# Patient Record
Sex: MALE | Race: WHITE | NOT HISPANIC OR LATINO | Employment: FULL TIME | URBAN - METROPOLITAN AREA
[De-identification: names, ages, dates, MRNs, and addresses within clinical notes are randomized per-mention and may not be internally consistent; named-entity substitution may affect disease eponyms.]

---

## 2024-09-27 ENCOUNTER — HOSPITAL ENCOUNTER (INPATIENT)
Facility: HOSPITAL | Age: 49
LOS: 5 days | Discharge: HOME | DRG: 871 | End: 2024-10-02
Attending: EMERGENCY MEDICINE | Admitting: INTERNAL MEDICINE
Payer: COMMERCIAL

## 2024-09-27 ENCOUNTER — APPOINTMENT (OUTPATIENT)
Dept: CT IMAGING | Facility: HOSPITAL | Age: 49
DRG: 871 | End: 2024-09-27
Payer: COMMERCIAL

## 2024-09-27 ENCOUNTER — APPOINTMENT (OUTPATIENT)
Dept: GENERAL RADIOLOGY | Facility: HOSPITAL | Age: 49
DRG: 871 | End: 2024-09-27
Payer: COMMERCIAL

## 2024-09-27 DIAGNOSIS — R13.12 DYSPHAGIA, OROPHARYNGEAL: ICD-10-CM

## 2024-09-27 DIAGNOSIS — T68.XXXA HYPOTHERMIA, INITIAL ENCOUNTER: Primary | ICD-10-CM

## 2024-09-27 DIAGNOSIS — E13.11 DIABETIC KETOACIDOSIS WITH COMA ASSOCIATED WITH OTHER SPECIFIED DIABETES MELLITUS: ICD-10-CM

## 2024-09-27 PROBLEM — N17.9 AKI (ACUTE KIDNEY INJURY): Status: ACTIVE | Noted: 2024-09-27

## 2024-09-27 PROBLEM — E11.10 DKA (DIABETIC KETOACIDOSIS): Status: ACTIVE | Noted: 2024-09-27

## 2024-09-27 PROBLEM — D72.829 LEUKOCYTOSIS: Status: ACTIVE | Noted: 2024-09-27

## 2024-09-27 PROBLEM — E87.20 METABOLIC ACIDOSIS: Status: ACTIVE | Noted: 2024-09-27

## 2024-09-27 PROBLEM — E83.39 HYPERPHOSPHATEMIA: Status: ACTIVE | Noted: 2024-09-27

## 2024-09-27 PROBLEM — G93.41 METABOLIC ENCEPHALOPATHY: Status: ACTIVE | Noted: 2024-09-27

## 2024-09-27 PROBLEM — J96.91 HYPOXIC RESPIRATORY FAILURE: Status: ACTIVE | Noted: 2024-09-27

## 2024-09-27 LAB
ACETONE BLD QL: ABNORMAL
ALBUMIN SERPL-MCNC: 3.5 G/DL (ref 3.5–5.2)
ALBUMIN/GLOB SERPL: 1.2 G/DL
ALP SERPL-CCNC: 199 U/L (ref 39–117)
ALT SERPL W P-5'-P-CCNC: 20 U/L (ref 1–41)
ANION GAP SERPL CALCULATED.3IONS-SCNC: 20.6 MMOL/L (ref 5–15)
ANION GAP SERPL CALCULATED.3IONS-SCNC: 27 MMOL/L (ref 5–15)
ANION GAP SERPL CALCULATED.3IONS-SCNC: 36.3 MMOL/L (ref 5–15)
ANISOCYTOSIS BLD QL: ABNORMAL
ARTERIAL PATENCY WRIST A: POSITIVE
ARTERIAL PATENCY WRIST A: POSITIVE
AST SERPL-CCNC: 17 U/L (ref 1–40)
ATMOSPHERIC PRESS: 723.3 MMHG
ATMOSPHERIC PRESS: 725.2 MMHG
B PARAPERT DNA SPEC QL NAA+PROBE: NOT DETECTED
B PERT DNA SPEC QL NAA+PROBE: NOT DETECTED
BACTERIA UR QL AUTO: ABNORMAL /HPF
BASE EXCESS BLDA CALC-SCNC: -25.2 MMOL/L (ref -2–2)
BASE EXCESS BLDA CALC-SCNC: -29.8 MMOL/L (ref -2–2)
BASOPHILS # BLD AUTO: 0.1 10*3/MM3 (ref 0–0.2)
BASOPHILS NFR BLD AUTO: 0.3 % (ref 0–1.5)
BDY SITE: ABNORMAL
BDY SITE: ABNORMAL
BILIRUB SERPL-MCNC: 0.3 MG/DL (ref 0–1.2)
BILIRUB UR QL STRIP: NEGATIVE
BUN BLDA-MCNC: 49 MG/DL (ref 8–23)
BUN BLDA-MCNC: 54 MG/DL (ref 8–23)
BUN SERPL-MCNC: 37 MG/DL (ref 6–20)
BUN SERPL-MCNC: 55 MG/DL (ref 6–20)
BUN SERPL-MCNC: 55 MG/DL (ref 6–20)
BUN/CREAT SERPL: 22.5 (ref 7–25)
BUN/CREAT SERPL: 25.2 (ref 7–25)
BUN/CREAT SERPL: 29.1 (ref 7–25)
C PNEUM DNA NPH QL NAA+NON-PROBE: NOT DETECTED
CA-I BLDA-SCNC: 1.14 MMOL/L (ref 1.13–1.32)
CA-I BLDA-SCNC: 1.16 MMOL/L (ref 1.13–1.32)
CALCIUM SPEC-SCNC: 4.4 MG/DL (ref 8.6–10.5)
CALCIUM SPEC-SCNC: 7.8 MG/DL (ref 8.6–10.5)
CALCIUM SPEC-SCNC: 8.8 MG/DL (ref 8.6–10.5)
CHLORIDE BLDA-SCNC: 112 MMOL/L (ref 98–107)
CHLORIDE BLDA-SCNC: 117 MMOL/L (ref 98–107)
CHLORIDE SERPL-SCNC: 104 MMOL/L (ref 98–107)
CHLORIDE SERPL-SCNC: 117 MMOL/L (ref 98–107)
CHLORIDE SERPL-SCNC: 94 MMOL/L (ref 98–107)
CLARITY UR: ABNORMAL
CO2 BLDA-SCNC: 8.4 MMOL/L (ref 23–27)
CO2 BLDA-SCNC: <5 MMOL/L (ref 23–27)
CO2 SERPL-SCNC: 4.7 MMOL/L (ref 22–29)
CO2 SERPL-SCNC: 5.4 MMOL/L (ref 22–29)
CO2 SERPL-SCNC: 7 MMOL/L (ref 22–29)
COLOR UR: YELLOW
CREAT BLDA-MCNC: 1.65 MG/DL (ref 0.6–1.3)
CREAT BLDA-MCNC: 1.79 MG/DL (ref 0.6–1.3)
CREAT SERPL-MCNC: 1.27 MG/DL (ref 0.76–1.27)
CREAT SERPL-MCNC: 2.18 MG/DL (ref 0.76–1.27)
CREAT SERPL-MCNC: 2.44 MG/DL (ref 0.76–1.27)
D-LACTATE SERPL-SCNC: 1.1 MMOL/L
D-LACTATE SERPL-SCNC: 1.8 MMOL/L (ref 0.5–2)
D-LACTATE SERPL-SCNC: 2 MMOL/L
DEPRECATED RDW RBC AUTO: 45.1 FL (ref 37–54)
DEPRECATED RDW RBC AUTO: 45.9 FL (ref 37–54)
EGFRCR SERPLBLD CKD-EPI 2021: 31.2 ML/MIN/1.73
EGFRCR SERPLBLD CKD-EPI 2021: 35.8 ML/MIN/1.73
EGFRCR SERPLBLD CKD-EPI 2021: 68.4 ML/MIN/1.73
EOSINOPHIL # BLD AUTO: 0.01 10*3/MM3 (ref 0–0.4)
EOSINOPHIL # BLD MANUAL: 0.34 10*3/MM3 (ref 0–0.4)
EOSINOPHIL NFR BLD AUTO: 0 % (ref 0.3–6.2)
EOSINOPHIL NFR BLD MANUAL: 1 % (ref 0.3–6.2)
ERYTHROCYTE [DISTWIDTH] IN BLOOD BY AUTOMATED COUNT: 12 % (ref 12.3–15.4)
ERYTHROCYTE [DISTWIDTH] IN BLOOD BY AUTOMATED COUNT: 12.2 % (ref 12.3–15.4)
FLUAV SUBTYP SPEC NAA+PROBE: NOT DETECTED
FLUBV RNA ISLT QL NAA+PROBE: NOT DETECTED
GLOBULIN UR ELPH-MCNC: 2.9 GM/DL
GLUCOSE BLDC GLUCOMTR-MCNC: 553 MG/DL (ref 70–99)
GLUCOSE BLDC GLUCOMTR-MCNC: >600 MG/DL (ref 70–99)
GLUCOSE BLDC GLUCOMTR-MCNC: >682 MG/DL (ref 70–99)
GLUCOSE BLDC GLUCOMTR-MCNC: >682 MG/DL (ref 70–99)
GLUCOSE SERPL-MCNC: 558 MG/DL (ref 65–99)
GLUCOSE SERPL-MCNC: 747 MG/DL (ref 65–99)
GLUCOSE SERPL-MCNC: 954 MG/DL (ref 65–99)
GLUCOSE UR STRIP-MCNC: ABNORMAL MG/DL
GRAN CASTS URNS QL MICRO: ABNORMAL /LPF
HADV DNA SPEC NAA+PROBE: NOT DETECTED
HBA1C MFR BLD: 13.9 % (ref 4.8–5.6)
HCO3 BLDA-SCNC: 4.7 MMOL/L (ref 22–26)
HCO3 BLDA-SCNC: 8.5 MMOL/L (ref 22–26)
HCOV 229E RNA SPEC QL NAA+PROBE: NOT DETECTED
HCOV HKU1 RNA SPEC QL NAA+PROBE: NOT DETECTED
HCOV NL63 RNA SPEC QL NAA+PROBE: NOT DETECTED
HCOV OC43 RNA SPEC QL NAA+PROBE: NOT DETECTED
HCT VFR BLD AUTO: 37.4 % (ref 37.5–51)
HCT VFR BLD AUTO: 47.8 % (ref 37.5–51)
HCT VFR BLD CALC: 47 % (ref 38–51)
HCT VFR BLD CALC: 48 % (ref 38–51)
HEMODILUTION: NO
HEMODILUTION: NO
HGB BLD-MCNC: 12.3 G/DL (ref 13–17.7)
HGB BLD-MCNC: 15.2 G/DL (ref 13–17.7)
HGB BLDA-MCNC: 15.9 G/DL (ref 12–18)
HGB BLDA-MCNC: 16.5 G/DL (ref 12–18)
HGB UR QL STRIP.AUTO: ABNORMAL
HMPV RNA NPH QL NAA+NON-PROBE: NOT DETECTED
HOLD SPECIMEN: NORMAL
HOLD SPECIMEN: NORMAL
HPIV1 RNA ISLT QL NAA+PROBE: NOT DETECTED
HPIV2 RNA SPEC QL NAA+PROBE: NOT DETECTED
HPIV3 RNA NPH QL NAA+PROBE: NOT DETECTED
HPIV4 P GENE NPH QL NAA+PROBE: NOT DETECTED
HYALINE CASTS UR QL AUTO: ABNORMAL /LPF
IMM GRANULOCYTES # BLD AUTO: 1.41 10*3/MM3 (ref 0–0.05)
IMM GRANULOCYTES NFR BLD AUTO: 4.8 % (ref 0–0.5)
INHALED O2 CONCENTRATION: 100 %
INHALED O2 CONCENTRATION: 30 %
KETONES UR QL STRIP: ABNORMAL
LARGE PLATELETS: ABNORMAL
LEUKOCYTE ESTERASE UR QL STRIP.AUTO: NEGATIVE
LYMPHOCYTES # BLD AUTO: 1.9 10*3/MM3 (ref 0.7–3.1)
LYMPHOCYTES # BLD MANUAL: 0.67 10*3/MM3 (ref 0.7–3.1)
LYMPHOCYTES NFR BLD AUTO: 6.5 % (ref 19.6–45.3)
LYMPHOCYTES NFR BLD MANUAL: 6 % (ref 5–12)
Lab: ABNORMAL
Lab: ABNORMAL
M PNEUMO IGG SER IA-ACNC: NOT DETECTED
MACROCYTES BLD QL SMEAR: ABNORMAL
MAGNESIUM SERPL-MCNC: 1.7 MG/DL (ref 1.6–2.6)
MAGNESIUM SERPL-MCNC: 2.9 MG/DL (ref 1.6–2.6)
MAGNESIUM SERPL-MCNC: 3.5 MG/DL (ref 1.6–2.6)
MCH RBC QN AUTO: 32.3 PG (ref 26.6–33)
MCH RBC QN AUTO: 33.1 PG (ref 26.6–33)
MCHC RBC AUTO-ENTMCNC: 31.8 G/DL (ref 31.5–35.7)
MCHC RBC AUTO-ENTMCNC: 32.9 G/DL (ref 31.5–35.7)
MCV RBC AUTO: 100.5 FL (ref 79–97)
MCV RBC AUTO: 101.5 FL (ref 79–97)
MODALITY: ABNORMAL
MODALITY: ABNORMAL
MONOCYTES # BLD AUTO: 1.34 10*3/MM3 (ref 0.1–0.9)
MONOCYTES # BLD: 2.02 10*3/MM3 (ref 0.1–0.9)
MONOCYTES NFR BLD AUTO: 4.6 % (ref 5–12)
MRSA DNA SPEC QL NAA+PROBE: NORMAL
MYELOCYTES NFR BLD MANUAL: 2 % (ref 0–0)
NEUTROPHILS # BLD AUTO: 29.91 10*3/MM3 (ref 1.7–7)
NEUTROPHILS NFR BLD AUTO: 24.54 10*3/MM3 (ref 1.7–7)
NEUTROPHILS NFR BLD AUTO: 83.8 % (ref 42.7–76)
NEUTROPHILS NFR BLD MANUAL: 89 % (ref 42.7–76)
NITRITE UR QL STRIP: NEGATIVE
NOTIFIED WHO: ABNORMAL
NRBC BLD AUTO-RTO: 0 /100 WBC (ref 0–0.2)
NT-PROBNP SERPL-MCNC: 99.4 PG/ML (ref 0–900)
OSMOLALITY SERPL: 347 MOSM/KG (ref 275–295)
OSMOLALITY SERPL: 373 MOSM/KG (ref 275–295)
PCO2 BLDA: 30.3 MM HG (ref 35–45)
PCO2 BLDA: 46.7 MM HG (ref 35–45)
PEEP RESPIRATORY: 5 CM[H2O]
PH BLDA: 6.8 PH UNITS (ref 7.35–7.45)
PH BLDA: 6.87 PH UNITS (ref 7.35–7.45)
PH UR STRIP.AUTO: <=5 [PH] (ref 5–8)
PHOSPHATE SERPL-MCNC: 11.2 MG/DL (ref 2.5–4.5)
PHOSPHATE SERPL-MCNC: 5.4 MG/DL (ref 2.5–4.5)
PHOSPHATE SERPL-MCNC: 8.9 MG/DL (ref 2.5–4.5)
PLATELET # BLD AUTO: 135 10*3/MM3 (ref 140–450)
PLATELET # BLD AUTO: 189 10*3/MM3 (ref 140–450)
PMV BLD AUTO: 12.7 FL (ref 6–12)
PMV BLD AUTO: 12.8 FL (ref 6–12)
PO2 BLD: 186 MM[HG] (ref 0–500)
PO2 BLD: 418 MM[HG] (ref 0–500)
PO2 BLDA: 125.4 MM HG (ref 80–100)
PO2 BLDA: 185.9 MM HG (ref 80–100)
POTASSIUM BLDA-SCNC: 5 MMOL/L (ref 3.5–5)
POTASSIUM BLDA-SCNC: 5.8 MMOL/L (ref 3.5–5)
POTASSIUM SERPL-SCNC: 3.6 MMOL/L (ref 3.5–5.2)
POTASSIUM SERPL-SCNC: 5.3 MMOL/L (ref 3.5–5.2)
POTASSIUM SERPL-SCNC: 6.5 MMOL/L (ref 3.5–5.2)
PROCALCITONIN SERPL-MCNC: 0.74 NG/ML (ref 0–0.25)
PROT SERPL-MCNC: 6.4 G/DL (ref 6–8.5)
PROT UR QL STRIP: ABNORMAL
QT INTERVAL: 526 MS
QTC INTERVAL: 501 MS
RBC # BLD AUTO: 3.72 10*6/MM3 (ref 4.14–5.8)
RBC # BLD AUTO: 4.71 10*6/MM3 (ref 4.14–5.8)
RBC # UR STRIP: ABNORMAL /HPF
READ BACK: YES
REF LAB TEST METHOD: ABNORMAL
RESPIRATORY RATE: 24
RHINOVIRUS RNA SPEC NAA+PROBE: NOT DETECTED
RSV RNA NPH QL NAA+NON-PROBE: NOT DETECTED
SAO2 % BLDCOA: 94.5 % (ref 95–99)
SAO2 % BLDCOA: 97.9 % (ref 95–99)
SARS-COV-2 RNA RESP QL NAA+PROBE: NOT DETECTED
SCAN SLIDE: NORMAL
SMALL PLATELETS BLD QL SMEAR: ADEQUATE
SODIUM BLD-SCNC: 132 MMOL/L (ref 131–143)
SODIUM BLD-SCNC: 136 MMOL/L (ref 131–143)
SODIUM SERPL-SCNC: 135 MMOL/L (ref 136–145)
SODIUM SERPL-SCNC: 138 MMOL/L (ref 136–145)
SODIUM SERPL-SCNC: 143 MMOL/L (ref 136–145)
SP GR UR STRIP: 1.02 (ref 1–1.03)
SQUAMOUS #/AREA URNS HPF: ABNORMAL /HPF
TRANS CELLS #/AREA URNS HPF: ABNORMAL /HPF
TROPONIN T SERPL HS-MCNC: 23 NG/L
UROBILINOGEN UR QL STRIP: ABNORMAL
VARIANT LYMPHS NFR BLD MANUAL: 2 % (ref 19.6–45.3)
VENTILATOR MODE: AC
VT ON VENT VENT: 400 ML
WBC # UR STRIP: ABNORMAL /HPF
WBC MORPH BLD: NORMAL
WBC NRBC COR # BLD AUTO: 29.3 10*3/MM3 (ref 3.4–10.8)
WBC NRBC COR # BLD AUTO: 33.61 10*3/MM3 (ref 3.4–10.8)
WHOLE BLOOD HOLD COAG: NORMAL
WHOLE BLOOD HOLD SPECIMEN: NORMAL

## 2024-09-27 PROCEDURE — 83930 ASSAY OF BLOOD OSMOLALITY: CPT | Performed by: EMERGENCY MEDICINE

## 2024-09-27 PROCEDURE — 99291 CRITICAL CARE FIRST HOUR: CPT | Performed by: INTERNAL MEDICINE

## 2024-09-27 PROCEDURE — 25010000002 ALBUMIN HUMAN 25% PER 50 ML: Performed by: INTERNAL MEDICINE

## 2024-09-27 PROCEDURE — 83605 ASSAY OF LACTIC ACID: CPT | Performed by: EMERGENCY MEDICINE

## 2024-09-27 PROCEDURE — 36415 COLL VENOUS BLD VENIPUNCTURE: CPT

## 2024-09-27 PROCEDURE — 5A1935Z RESPIRATORY VENTILATION, LESS THAN 24 CONSECUTIVE HOURS: ICD-10-PCS | Performed by: INTERNAL MEDICINE

## 2024-09-27 PROCEDURE — 82948 REAGENT STRIP/BLOOD GLUCOSE: CPT

## 2024-09-27 PROCEDURE — 87040 BLOOD CULTURE FOR BACTERIA: CPT | Performed by: EMERGENCY MEDICINE

## 2024-09-27 PROCEDURE — 82803 BLOOD GASES ANY COMBINATION: CPT

## 2024-09-27 PROCEDURE — 83735 ASSAY OF MAGNESIUM: CPT | Performed by: EMERGENCY MEDICINE

## 2024-09-27 PROCEDURE — 25010000002 CEFEPIME PER 500 MG: Performed by: STUDENT IN AN ORGANIZED HEALTH CARE EDUCATION/TRAINING PROGRAM

## 2024-09-27 PROCEDURE — 36600 WITHDRAWAL OF ARTERIAL BLOOD: CPT

## 2024-09-27 PROCEDURE — 80047 BASIC METABLC PNL IONIZED CA: CPT

## 2024-09-27 PROCEDURE — 94799 UNLISTED PULMONARY SVC/PX: CPT

## 2024-09-27 PROCEDURE — 93005 ELECTROCARDIOGRAM TRACING: CPT | Performed by: EMERGENCY MEDICINE

## 2024-09-27 PROCEDURE — 25010000002 MAGNESIUM SULFATE 2 GM/50ML SOLUTION: Performed by: PHYSICIAN ASSISTANT

## 2024-09-27 PROCEDURE — 25010000002 CALCIUM GLUCONATE-NACL 1-0.675 GM/50ML-% SOLUTION: Performed by: PHYSICIAN ASSISTANT

## 2024-09-27 PROCEDURE — 25010000002 HYDROCORTISONE SOD SUC (PF) 100 MG RECONSTITUTED SOLUTION: Performed by: INTERNAL MEDICINE

## 2024-09-27 PROCEDURE — 84145 PROCALCITONIN (PCT): CPT | Performed by: PHYSICIAN ASSISTANT

## 2024-09-27 PROCEDURE — 83036 HEMOGLOBIN GLYCOSYLATED A1C: CPT | Performed by: EMERGENCY MEDICINE

## 2024-09-27 PROCEDURE — 87077 CULTURE AEROBIC IDENTIFY: CPT | Performed by: EMERGENCY MEDICINE

## 2024-09-27 PROCEDURE — P9047 ALBUMIN (HUMAN), 25%, 50ML: HCPCS | Performed by: INTERNAL MEDICINE

## 2024-09-27 PROCEDURE — 87641 MR-STAPH DNA AMP PROBE: CPT | Performed by: PHYSICIAN ASSISTANT

## 2024-09-27 PROCEDURE — 71045 X-RAY EXAM CHEST 1 VIEW: CPT

## 2024-09-27 PROCEDURE — 88312 SPECIAL STAINS GROUP 1: CPT | Performed by: EMERGENCY MEDICINE

## 2024-09-27 PROCEDURE — 94002 VENT MGMT INPAT INIT DAY: CPT

## 2024-09-27 PROCEDURE — 85007 BL SMEAR W/DIFF WBC COUNT: CPT | Performed by: EMERGENCY MEDICINE

## 2024-09-27 PROCEDURE — 83605 ASSAY OF LACTIC ACID: CPT

## 2024-09-27 PROCEDURE — 25010000002 POTASSIUM CHLORIDE PER 2 MEQ: Performed by: EMERGENCY MEDICINE

## 2024-09-27 PROCEDURE — 85025 COMPLETE CBC W/AUTO DIFF WBC: CPT | Performed by: EMERGENCY MEDICINE

## 2024-09-27 PROCEDURE — 87449 NOS EACH ORGANISM AG IA: CPT | Performed by: PHYSICIAN ASSISTANT

## 2024-09-27 PROCEDURE — 0 DEXTROSE 5 % SOLUTION: Performed by: INTERNAL MEDICINE

## 2024-09-27 PROCEDURE — 84100 ASSAY OF PHOSPHORUS: CPT | Performed by: EMERGENCY MEDICINE

## 2024-09-27 PROCEDURE — 25810000003 SODIUM CHLORIDE 0.9 % SOLUTION: Performed by: EMERGENCY MEDICINE

## 2024-09-27 PROCEDURE — 83880 ASSAY OF NATRIURETIC PEPTIDE: CPT | Performed by: EMERGENCY MEDICINE

## 2024-09-27 PROCEDURE — 93010 ELECTROCARDIOGRAM REPORT: CPT | Performed by: INTERNAL MEDICINE

## 2024-09-27 PROCEDURE — 99291 CRITICAL CARE FIRST HOUR: CPT

## 2024-09-27 PROCEDURE — 0202U NFCT DS 22 TRGT SARS-COV-2: CPT | Performed by: PHYSICIAN ASSISTANT

## 2024-09-27 PROCEDURE — 84484 ASSAY OF TROPONIN QUANT: CPT | Performed by: EMERGENCY MEDICINE

## 2024-09-27 PROCEDURE — 87154 CUL TYP ID BLD PTHGN 6+ TRGT: CPT | Performed by: EMERGENCY MEDICINE

## 2024-09-27 PROCEDURE — 80053 COMPREHEN METABOLIC PANEL: CPT | Performed by: EMERGENCY MEDICINE

## 2024-09-27 PROCEDURE — 25010000002 VANCOMYCIN 5 G RECONSTITUTED SOLUTION: Performed by: EMERGENCY MEDICINE

## 2024-09-27 PROCEDURE — 0 DEXTROSE 5 % SOLUTION: Performed by: PHYSICIAN ASSISTANT

## 2024-09-27 PROCEDURE — 25010000002 CALCIUM GLUCONATE 2-0.675 GM/100ML-% SOLUTION: Performed by: INTERNAL MEDICINE

## 2024-09-27 PROCEDURE — 81001 URINALYSIS AUTO W/SCOPE: CPT | Performed by: INTERNAL MEDICINE

## 2024-09-27 PROCEDURE — 87186 SC STD MICRODIL/AGAR DIL: CPT | Performed by: EMERGENCY MEDICINE

## 2024-09-27 PROCEDURE — 70450 CT HEAD/BRAIN W/O DYE: CPT

## 2024-09-27 PROCEDURE — 99223 1ST HOSP IP/OBS HIGH 75: CPT | Performed by: STUDENT IN AN ORGANIZED HEALTH CARE EDUCATION/TRAINING PROGRAM

## 2024-09-27 PROCEDURE — 25010000002 CEFEPIME PER 500 MG: Performed by: EMERGENCY MEDICINE

## 2024-09-27 PROCEDURE — 36415 COLL VENOUS BLD VENIPUNCTURE: CPT | Performed by: EMERGENCY MEDICINE

## 2024-09-27 PROCEDURE — 82009 KETONE BODYS QUAL: CPT | Performed by: EMERGENCY MEDICINE

## 2024-09-27 RX ORDER — SODIUM CHLORIDE 0.9 % (FLUSH) 0.9 %
10 SYRINGE (ML) INJECTION AS NEEDED
Status: DISCONTINUED | OUTPATIENT
Start: 2024-09-27 | End: 2024-10-02 | Stop reason: HOSPADM

## 2024-09-27 RX ORDER — DEXTROSE MONOHYDRATE, SODIUM CHLORIDE, AND POTASSIUM CHLORIDE 50; 2.98; 9 G/1000ML; G/1000ML; G/1000ML
150 INJECTION, SOLUTION INTRAVENOUS CONTINUOUS PRN
Status: DISCONTINUED | OUTPATIENT
Start: 2024-09-27 | End: 2024-09-28

## 2024-09-27 RX ORDER — ACETAMINOPHEN 650 MG/1
650 SUPPOSITORY RECTAL EVERY 4 HOURS PRN
Status: DISCONTINUED | OUTPATIENT
Start: 2024-09-27 | End: 2024-10-02 | Stop reason: HOSPADM

## 2024-09-27 RX ORDER — SODIUM CHLORIDE AND POTASSIUM CHLORIDE 300; 900 MG/100ML; MG/100ML
250 INJECTION, SOLUTION INTRAVENOUS CONTINUOUS PRN
Status: DISCONTINUED | OUTPATIENT
Start: 2024-09-27 | End: 2024-09-28

## 2024-09-27 RX ORDER — ACETAMINOPHEN 325 MG/1
650 TABLET ORAL EVERY 4 HOURS PRN
Status: DISCONTINUED | OUTPATIENT
Start: 2024-09-27 | End: 2024-10-02 | Stop reason: HOSPADM

## 2024-09-27 RX ORDER — SODIUM CHLORIDE AND POTASSIUM CHLORIDE 150; 450 MG/100ML; MG/100ML
250 INJECTION, SOLUTION INTRAVENOUS CONTINUOUS PRN
Status: DISCONTINUED | OUTPATIENT
Start: 2024-09-27 | End: 2024-09-28

## 2024-09-27 RX ORDER — SODIUM CHLORIDE 450 MG/100ML
250 INJECTION, SOLUTION INTRAVENOUS CONTINUOUS PRN
Status: DISCONTINUED | OUTPATIENT
Start: 2024-09-27 | End: 2024-09-28

## 2024-09-27 RX ORDER — NICOTINE POLACRILEX 4 MG
15 LOZENGE BUCCAL
Status: DISCONTINUED | OUTPATIENT
Start: 2024-09-27 | End: 2024-09-28

## 2024-09-27 RX ORDER — DEXTROSE MONOHYDRATE, SODIUM CHLORIDE, AND POTASSIUM CHLORIDE 50; 2.98; 4.5 G/1000ML; G/1000ML; G/1000ML
150 INJECTION, SOLUTION INTRAVENOUS CONTINUOUS PRN
Status: DISCONTINUED | OUTPATIENT
Start: 2024-09-27 | End: 2024-09-28

## 2024-09-27 RX ORDER — IBUPROFEN 600 MG/1
1 TABLET ORAL
Status: DISCONTINUED | OUTPATIENT
Start: 2024-09-27 | End: 2024-09-28

## 2024-09-27 RX ORDER — CHLORHEXIDINE GLUCONATE 0.12 MG/ML
15 RINSE ORAL EVERY 12 HOURS SCHEDULED
Status: DISCONTINUED | OUTPATIENT
Start: 2024-09-27 | End: 2024-09-30

## 2024-09-27 RX ORDER — FENTANYL CITRATE-0.9 % NACL/PF 10 MCG/ML
50-300 PLASTIC BAG, INJECTION (ML) INTRAVENOUS
Status: DISCONTINUED | OUTPATIENT
Start: 2024-09-27 | End: 2024-09-28

## 2024-09-27 RX ORDER — VANCOMYCIN 2 GRAM/500 ML IN 0.9 % SODIUM CHLORIDE INTRAVENOUS
20 ONCE
Status: COMPLETED | OUTPATIENT
Start: 2024-09-27 | End: 2024-09-27

## 2024-09-27 RX ORDER — MAGNESIUM SULFATE HEPTAHYDRATE 40 MG/ML
2 INJECTION, SOLUTION INTRAVENOUS
Status: COMPLETED | OUTPATIENT
Start: 2024-09-27 | End: 2024-09-27

## 2024-09-27 RX ORDER — SODIUM CHLORIDE 9 MG/ML
40 INJECTION, SOLUTION INTRAVENOUS AS NEEDED
Status: DISCONTINUED | OUTPATIENT
Start: 2024-09-27 | End: 2024-09-28

## 2024-09-27 RX ORDER — SODIUM CHLORIDE 0.9 % (FLUSH) 0.9 %
10 SYRINGE (ML) INJECTION AS NEEDED
Status: DISCONTINUED | OUTPATIENT
Start: 2024-09-27 | End: 2024-09-28

## 2024-09-27 RX ORDER — ONDANSETRON 2 MG/ML
4 INJECTION INTRAMUSCULAR; INTRAVENOUS EVERY 6 HOURS PRN
Status: DISCONTINUED | OUTPATIENT
Start: 2024-09-27 | End: 2024-10-02 | Stop reason: HOSPADM

## 2024-09-27 RX ORDER — DEXTROSE MONOHYDRATE, SODIUM CHLORIDE, AND POTASSIUM CHLORIDE 50; 1.49; 4.5 G/1000ML; G/1000ML; G/1000ML
150 INJECTION, SOLUTION INTRAVENOUS CONTINUOUS PRN
Status: DISCONTINUED | OUTPATIENT
Start: 2024-09-27 | End: 2024-09-28

## 2024-09-27 RX ORDER — AMOXICILLIN 250 MG
2 CAPSULE ORAL 2 TIMES DAILY
Status: DISCONTINUED | OUTPATIENT
Start: 2024-09-27 | End: 2024-10-02 | Stop reason: HOSPADM

## 2024-09-27 RX ORDER — CALCIUM GLUCONATE 20 MG/ML
2000 INJECTION, SOLUTION INTRAVENOUS ONCE
Status: COMPLETED | OUTPATIENT
Start: 2024-09-27 | End: 2024-09-27

## 2024-09-27 RX ORDER — SODIUM CHLORIDE 0.9 % (FLUSH) 0.9 %
10 SYRINGE (ML) INJECTION EVERY 12 HOURS SCHEDULED
Status: DISCONTINUED | OUTPATIENT
Start: 2024-09-27 | End: 2024-10-02 | Stop reason: HOSPADM

## 2024-09-27 RX ORDER — SODIUM CHLORIDE AND POTASSIUM CHLORIDE 150; 900 MG/100ML; MG/100ML
250 INJECTION, SOLUTION INTRAVENOUS CONTINUOUS PRN
Status: DISCONTINUED | OUTPATIENT
Start: 2024-09-27 | End: 2024-09-28

## 2024-09-27 RX ORDER — ALBUMIN (HUMAN) 12.5 G/50ML
25 SOLUTION INTRAVENOUS ONCE
Status: COMPLETED | OUTPATIENT
Start: 2024-09-27 | End: 2024-09-27

## 2024-09-27 RX ORDER — DEXTROSE MONOHYDRATE 25 G/50ML
10-50 INJECTION, SOLUTION INTRAVENOUS
Status: DISCONTINUED | OUTPATIENT
Start: 2024-09-27 | End: 2024-09-28

## 2024-09-27 RX ORDER — ONDANSETRON 4 MG/1
4 TABLET, ORALLY DISINTEGRATING ORAL EVERY 6 HOURS PRN
Status: DISCONTINUED | OUTPATIENT
Start: 2024-09-27 | End: 2024-10-02 | Stop reason: HOSPADM

## 2024-09-27 RX ORDER — FAMOTIDINE 10 MG/ML
20 INJECTION, SOLUTION INTRAVENOUS 2 TIMES DAILY
Status: DISCONTINUED | OUTPATIENT
Start: 2024-09-27 | End: 2024-09-30

## 2024-09-27 RX ORDER — DEXTROSE MONOHYDRATE AND SODIUM CHLORIDE 5; .9 G/100ML; G/100ML
150 INJECTION, SOLUTION INTRAVENOUS CONTINUOUS PRN
Status: DISCONTINUED | OUTPATIENT
Start: 2024-09-27 | End: 2024-09-28

## 2024-09-27 RX ORDER — NITROGLYCERIN 0.4 MG/1
0.4 TABLET SUBLINGUAL
Status: DISCONTINUED | OUTPATIENT
Start: 2024-09-27 | End: 2024-10-02 | Stop reason: HOSPADM

## 2024-09-27 RX ORDER — CALCIUM GLUCONATE 20 MG/ML
1000 INJECTION, SOLUTION INTRAVENOUS ONCE
Status: COMPLETED | OUTPATIENT
Start: 2024-09-27 | End: 2024-09-27

## 2024-09-27 RX ORDER — DEXTROSE MONOHYDRATE AND SODIUM CHLORIDE 5; .45 G/100ML; G/100ML
150 INJECTION, SOLUTION INTRAVENOUS CONTINUOUS PRN
Status: DISCONTINUED | OUTPATIENT
Start: 2024-09-27 | End: 2024-09-28

## 2024-09-27 RX ORDER — SODIUM CHLORIDE 9 MG/ML
250 INJECTION, SOLUTION INTRAVENOUS CONTINUOUS PRN
Status: DISCONTINUED | OUTPATIENT
Start: 2024-09-27 | End: 2024-09-28

## 2024-09-27 RX ORDER — NOREPINEPHRINE BITARTRATE 0.03 MG/ML
.02-.3 INJECTION, SOLUTION INTRAVENOUS
Status: DISCONTINUED | OUTPATIENT
Start: 2024-09-27 | End: 2024-09-29

## 2024-09-27 RX ORDER — DEXTROSE MONOHYDRATE, SODIUM CHLORIDE, AND POTASSIUM CHLORIDE 50; 1.49; 9 G/1000ML; G/1000ML; G/1000ML
150 INJECTION, SOLUTION INTRAVENOUS CONTINUOUS PRN
Status: DISCONTINUED | OUTPATIENT
Start: 2024-09-27 | End: 2024-09-28

## 2024-09-27 RX ORDER — POLYETHYLENE GLYCOL 3350 17 G/17G
17 POWDER, FOR SOLUTION ORAL DAILY PRN
Status: DISCONTINUED | OUTPATIENT
Start: 2024-09-27 | End: 2024-10-02 | Stop reason: HOSPADM

## 2024-09-27 RX ORDER — BISACODYL 10 MG
10 SUPPOSITORY, RECTAL RECTAL DAILY PRN
Status: DISCONTINUED | OUTPATIENT
Start: 2024-09-27 | End: 2024-10-02 | Stop reason: HOSPADM

## 2024-09-27 RX ORDER — BISACODYL 5 MG/1
5 TABLET, DELAYED RELEASE ORAL DAILY PRN
Status: DISCONTINUED | OUTPATIENT
Start: 2024-09-27 | End: 2024-10-02 | Stop reason: HOSPADM

## 2024-09-27 RX ADMIN — VANCOMYCIN HYDROCHLORIDE 2000 MG: 5 INJECTION, POWDER, LYOPHILIZED, FOR SOLUTION INTRAVENOUS at 16:24

## 2024-09-27 RX ADMIN — CALCIUM GLUCONATE 1000 MG: 20 INJECTION, SOLUTION INTRAVENOUS at 21:46

## 2024-09-27 RX ADMIN — CHLORHEXIDINE GLUCONATE 15 ML: 1.2 RINSE ORAL at 20:30

## 2024-09-27 RX ADMIN — SODIUM BICARBONATE 50 MEQ: 84 INJECTION INTRAVENOUS at 14:07

## 2024-09-27 RX ADMIN — SODIUM BICARBONATE 150 MEQ: 84 INJECTION INTRAVENOUS at 18:37

## 2024-09-27 RX ADMIN — SODIUM BICARBONATE 100 MEQ: 84 INJECTION INTRAVENOUS at 19:49

## 2024-09-27 RX ADMIN — MAGNESIUM SULFATE HEPTAHYDRATE 2 G: 40 INJECTION, SOLUTION INTRAVENOUS at 20:30

## 2024-09-27 RX ADMIN — SODIUM CHLORIDE 250 ML/HR: 4.5 INJECTION, SOLUTION INTRAVENOUS at 20:21

## 2024-09-27 RX ADMIN — FAMOTIDINE 20 MG: 10 INJECTION INTRAVENOUS at 20:40

## 2024-09-27 RX ADMIN — NOREPINEPHRINE BITARTRATE 0.04 MCG/KG/MIN: 0.03 INJECTION, SOLUTION INTRAVENOUS at 14:12

## 2024-09-27 RX ADMIN — INSULIN HUMAN 5.4 UNITS/HR: 1 INJECTION, SOLUTION INTRAVENOUS at 16:58

## 2024-09-27 RX ADMIN — CALCIUM GLUCONATE 2000 MG: 20 INJECTION, SOLUTION INTRAVENOUS at 22:16

## 2024-09-27 RX ADMIN — ALBUMIN (HUMAN) 25 G: 0.25 INJECTION, SOLUTION INTRAVENOUS at 22:52

## 2024-09-27 RX ADMIN — SODIUM BICARBONATE 50 MEQ: 84 INJECTION INTRAVENOUS at 14:10

## 2024-09-27 RX ADMIN — CEFEPIME 2000 MG: 2 INJECTION, POWDER, FOR SOLUTION INTRAVENOUS at 15:25

## 2024-09-27 RX ADMIN — Medication 100 MEQ: at 19:49

## 2024-09-27 RX ADMIN — POTASSIUM CHLORIDE AND SODIUM CHLORIDE 250 ML/HR: 450; 150 INJECTION, SOLUTION INTRAVENOUS at 18:13

## 2024-09-27 RX ADMIN — Medication 10 ML: at 22:03

## 2024-09-27 RX ADMIN — SODIUM CHLORIDE 2000 ML: 9 INJECTION, SOLUTION INTRAVENOUS at 14:06

## 2024-09-27 RX ADMIN — INSULIN HUMAN 10.6 UNITS/HR: 1 INJECTION, SOLUTION INTRAVENOUS at 23:17

## 2024-09-27 RX ADMIN — VASOPRESSIN IN 0.9% SODIUM CHLORIDE 0.04 UNITS/MIN: 20 INJECTION INTRAVENOUS at 19:48

## 2024-09-27 RX ADMIN — SODIUM BICARBONATE 150 MEQ: 84 INJECTION INTRAVENOUS at 19:48

## 2024-09-27 RX ADMIN — Medication 10 ML: at 20:42

## 2024-09-27 RX ADMIN — MAGNESIUM SULFATE HEPTAHYDRATE 2 G: 40 INJECTION, SOLUTION INTRAVENOUS at 18:50

## 2024-09-27 RX ADMIN — NOREPINEPHRINE BITARTRATE 0.34 MCG/KG/MIN: 0.03 INJECTION, SOLUTION INTRAVENOUS at 21:56

## 2024-09-27 RX ADMIN — SODIUM BICARBONATE 100 MEQ: 84 INJECTION INTRAVENOUS at 22:16

## 2024-09-27 RX ADMIN — HYDROCORTISONE SODIUM SUCCINATE 100 MG: 100 INJECTION, POWDER, FOR SOLUTION INTRAMUSCULAR; INTRAVENOUS at 22:52

## 2024-09-27 RX ADMIN — CEFEPIME 2000 MG: 2 INJECTION, POWDER, FOR SOLUTION INTRAVENOUS at 23:02

## 2024-09-27 RX ADMIN — SODIUM CHLORIDE 1000 ML/HR: 9 INJECTION, SOLUTION INTRAVENOUS at 15:50

## 2024-09-27 NOTE — H&P
"    Patient Care Team:  Provider, No Known as PCP - General    Chief complaint unresponsive    Subjective     Patient is a 49 y.o. male presents to the emergency department unresponsive.  He was found in the back of his truck after not reporting to work.  We are uncertain as to how long he has been back there he has apparently been having nausea and vomiting for the past several weeks.  He does not have any medical diagnoses.  His wife stated that they thought that he may have diabetes because of \"skin tags\" and has a strong family history of diabetes.  When patient arrived he was obtunded and intubated.  He also had severe hypothermia    Review of Systems   Pertinent items are noted in HPI    History  No past medical history on file.  No past surgical history on file.  No family history on file.     (Not in a hospital admission)   Allergies:  Patient has no known allergies.    Objective     Vital Signs  Temp:  [85.1 °F (29.5 °C)-86 °F (30 °C)] 86 °F (30 °C)  Heart Rate:  [61-75] 75  Resp:  [18] 18  BP: ()/() 113/55  FiO2 (%):  [30 %] 30 %    Physical Exam:      General Appearance:  Intubated, unresponsive   Head:  Normocephalic, without obvious abnormality, atraumatic   Eyes:  Lids and lashes normal, conjunctivae and sclerae normal, no icterus, no pallor, corneas clear, PERRLA   Ears:  Ears appear intact with no abnormalities noted   Throat:  No oral lesions, no thrush, oral mucosa moist   Neck:  No adenopathy, supple, trachea midline, no thyromegaly, no carotid bruit, no JVD   Back:  No kyphosis present, no scoliosis present, no skin lesions, erythema or scars, no tenderness to percussion or palpation, range of motion normal   Lungs:  Clear to auscultation, respirations regular, even and unlabored    Heart:  Regular rhythm and normal rate, normal S1 and S2, no murmur, no gallop, no rub, no click   Chest Wall:  No abnormalities observed   Abdomen:  Normal bowel sounds, no masses, no organomegaly, soft " non-tender, non-distended, no guarding, no rebound tenderness   Rectal:  Deferred   Extremities:  Moves all extremities well, no edema, no cyanosis, no redness   Pulses:  Pulses palpable and equal bilaterally   Skin:  No bleeding, bruising or rash   Lymph nodes:  No palpable adenopathy   Neurologic:  Intubated, unresponsive           Results Review:    I reviewed the patient's new clinical results.  I reviewed the patient's new imaging results and agree with the interpretation.  I reviewed the patient's other test results and agree with the interpretation  I personally viewed and interpreted the patient's EKG/Telemetry data    Assessment & Plan       DKA (diabetic ketoacidosis)    Metabolic acidosis    Hypothermia    Hypoxic respiratory failure    Leukocytosis    Hyperphosphatemia    CHARI (acute kidney injury)    Metabolic encephalopathy      Admit to ICU  Closely monitor hemodynamics and provide pressor support as needed for MAP greater than 65  Continue rewarming body gradually  Insulin drip with DKA protocol and appropriate fluid exchanges  Continue ventilatory support and wean when able  Serial BMPs  Serial ABGs  Continue to monitor neurologic status, start sedatives when appropriate  N.p.o.  Full code    ER team spoke to patient's wife who is driving from [place].      Thomas Glasgow MD  09/27/24  16:05 EDT

## 2024-09-27 NOTE — CONSULTS
[facility]   Consult Note    Patient Name: Jaime Franco  : 1975  MRN: [id number]  Primary Care Physician:  Provider, No Known  Referring Physician: No Known Provider  Date of admission: 2024    Inpatient Pulmonology Consult  Consult performed by: Kalyan Freeman DO  Consult ordered by: Bobby Cowart MD        Subjective   Subjective     Reason for Consult/ Chief Complaint: Unresponsive    History of Present Illness  Critically ill intubated  On ventilator  Found in his truck unconscious  Temperature of 81  Feeling poorly for the past several days    Review of Systems   Unable to obtain  Personal History     Past Medical History:   Diagnosis Date    Sleep apnea        Past Surgical History:   Procedure Laterality Date    WISDOM TOOTH EXTRACTION         Family History: family history is not on file. Otherwise pertinent FHx was reviewed and not pertinent to current issue.    Social History:      Home Medications:        Allergies:  No Known Allergies    Objective    Objective     Vitals:  Temp:  [85.1 °F (29.5 °C)-86 °F (30 °C)] 86 °F (30 °C)  Heart Rate:  [61-75] 75  Resp:  [18] 18  BP: ()/() 113/55  FiO2 (%):  [30 %] 30 %    Physical Exam  Critically ill  Intubated  On ventilator  Does not follow commands  Unresponsive  Result Review    Result Review:  I have personally reviewed the results from the time of this admission to 2024 16:39 EDT and agree with these findings:  []  Laboratory  []  Microbiology  []  Radiology  []  EKG/Telemetry   []  Cardiology/Vascular   []  Pathology  []  Old records  []  Other:    Most notable findings include:   Large amount of acetone  ABG with pH of 6.8/pCO2 of 30/PaO2 of 185/bicarb of 4.7    Assessment & Plan   Assessment / Plan     Brief Patient Summary:  Jaime Franco is a 49 y.o. male who was found unresponsive with unknown downtime outside of hospital, intubated for airway protection    Active Hospital Problems:  Active Hospital  Problems    Diagnosis     **DKA (diabetic ketoacidosis)     Metabolic acidosis     Hypothermia     Hypoxic respiratory failure     Leukocytosis     Hyperphosphatemia     CHARI (acute kidney injury)     Metabolic encephalopathy        Plan:   Continue current ventilator settings assist-control tidal volume 450 respiratory rate of 20 PEEP of 5  Levophed to maintain mean arterial pressure 65  Vasopressin added  Continue DKA protocol with insulin drip  Bicarbonate drip added due to profound acidosis and hypotension  Infectious workup has been sent  Thyroid function has been sent  IV Solu-Cortef started due to hypothermia and persistent hypotension  Bear hugger in place  Continue insulin drip      I, DEVENDRA Garcia [id number] in accordance with split shared billing.  Electronically signed by DEVENDRA Lopez, 09/27/24, 6:17 PM EDT.  Total critical care time spent by our service 40 minutes    Pt is critically ill in ICU with  metabolic encephalopathy, metabolic acidosis, hypothermia, hypoxic respiratory failure, DKA, electrolyte disturbances I have [id number] of critical care time reviewing previous documentation, reviewing all pertinent telemetry, labs, and imaging studies, examining the patient, modifying the care plan and discussing the patient´s condition and care plan with any available family, the primary service and during multidisciplinary rounds this morning at bedside. This does not include any procedures performed.      Electronically signed by Kalyan Freeman DO, 09/27/24, 4:39 PM EDT.

## 2024-09-27 NOTE — PLAN OF CARE
Goal Outcome Evaluation:  Plan of Care Reviewed With: patient           Outcome Evaluation: brought to ICU on ventilator and unresponsive. dong catheter in place, warming blanket placed, DKA protocol initiated per MAR, levo drip continued per MAR. no complaints at this time.

## 2024-09-27 NOTE — CONSULTS
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 no family has arrived at this time.)

## 2024-09-28 ENCOUNTER — APPOINTMENT (OUTPATIENT)
Dept: CARDIOLOGY | Facility: HOSPITAL | Age: 49
DRG: 871 | End: 2024-09-28
Payer: COMMERCIAL

## 2024-09-28 LAB
ANION GAP SERPL CALCULATED.3IONS-SCNC: 13.5 MMOL/L (ref 5–15)
ANION GAP SERPL CALCULATED.3IONS-SCNC: 21.8 MMOL/L (ref 5–15)
ANION GAP SERPL CALCULATED.3IONS-SCNC: 29.6 MMOL/L (ref 5–15)
ANION GAP SERPL CALCULATED.3IONS-SCNC: 9.1 MMOL/L (ref 5–15)
ARTERIAL PATENCY WRIST A: POSITIVE
ARTERIAL PATENCY WRIST A: POSITIVE
ATMOSPHERIC PRESS: 728.3 MMHG
ATMOSPHERIC PRESS: 731 MMHG
BACTERIA BLD CULT: ABNORMAL
BASE EXCESS BLDA CALC-SCNC: -0.7 MMOL/L (ref -2–2)
BASE EXCESS BLDA CALC-SCNC: -11.5 MMOL/L (ref -2–2)
BDY SITE: ABNORMAL
BDY SITE: ABNORMAL
BH CV ECHO MEAS - AO MAX PG: 9.2 MMHG
BH CV ECHO MEAS - AO MEAN PG: 5 MMHG
BH CV ECHO MEAS - AO ROOT DIAM: 3.7 CM
BH CV ECHO MEAS - AO V2 MAX: 152 CM/SEC
BH CV ECHO MEAS - AO V2 VTI: 25.1 CM
BH CV ECHO MEAS - AVA(I,D): 2.8 CM2
BH CV ECHO MEAS - EDV(CUBED): 157.5 ML
BH CV ECHO MEAS - EDV(MOD-SP2): 79.9 ML
BH CV ECHO MEAS - EDV(MOD-SP4): 48.2 ML
BH CV ECHO MEAS - EF(MOD-BP): 57.3 %
BH CV ECHO MEAS - EF(MOD-SP2): 64.3 %
BH CV ECHO MEAS - EF(MOD-SP4): 50.4 %
BH CV ECHO MEAS - ESV(CUBED): 27 ML
BH CV ECHO MEAS - ESV(MOD-SP2): 28.5 ML
BH CV ECHO MEAS - ESV(MOD-SP4): 23.9 ML
BH CV ECHO MEAS - FS: 44.4 %
BH CV ECHO MEAS - IVS/LVPW: 0.73 CM
BH CV ECHO MEAS - IVSD: 0.8 CM
BH CV ECHO MEAS - LA DIMENSION: 3.2 CM
BH CV ECHO MEAS - LAT PEAK E' VEL: 14.1 CM/SEC
BH CV ECHO MEAS - LV DIASTOLIC VOL/BSA (35-75): 22.3 CM2
BH CV ECHO MEAS - LV MASS(C)D: 193.3 GRAMS
BH CV ECHO MEAS - LV MAX PG: 6.8 MMHG
BH CV ECHO MEAS - LV MEAN PG: 4 MMHG
BH CV ECHO MEAS - LV SYSTOLIC VOL/BSA (12-30): 11 CM2
BH CV ECHO MEAS - LV V1 MAX: 130 CM/SEC
BH CV ECHO MEAS - LV V1 VTI: 22.7 CM
BH CV ECHO MEAS - LVIDD: 5.4 CM
BH CV ECHO MEAS - LVIDS: 3 CM
BH CV ECHO MEAS - LVOT AREA: 3.1 CM2
BH CV ECHO MEAS - LVOT DIAM: 2 CM
BH CV ECHO MEAS - LVPWD: 1.1 CM
BH CV ECHO MEAS - MED PEAK E' VEL: 8.3 CM/SEC
BH CV ECHO MEAS - MV A MAX VEL: 73.4 CM/SEC
BH CV ECHO MEAS - MV DEC SLOPE: 450 CM/SEC2
BH CV ECHO MEAS - MV E MAX VEL: 72.5 CM/SEC
BH CV ECHO MEAS - MV E/A: 0.99
BH CV ECHO MEAS - MV MEAN PG: 2 MMHG
BH CV ECHO MEAS - MV P1/2T: 47.5 MSEC
BH CV ECHO MEAS - MV V2 VTI: 25.8 CM
BH CV ECHO MEAS - MVA(P1/2T): 4.6 CM2
BH CV ECHO MEAS - MVA(VTI): 2.8 CM2
BH CV ECHO MEAS - RVDD: 2.5 CM
BH CV ECHO MEAS - SV(LVOT): 71.3 ML
BH CV ECHO MEAS - SV(MOD-SP2): 51.4 ML
BH CV ECHO MEAS - SV(MOD-SP4): 24.3 ML
BH CV ECHO MEAS - SVI(LVOT): 32.9 ML/M2
BH CV ECHO MEAS - SVI(MOD-SP2): 23.7 ML/M2
BH CV ECHO MEAS - SVI(MOD-SP4): 11.2 ML/M2
BH CV ECHO MEAS - TAPSE (>1.6): 2.06 CM
BH CV ECHO MEAS - TR MAX PG: 23.6 MMHG
BH CV ECHO MEAS - TR MAX VEL: 243 CM/SEC
BH CV ECHO MEASUREMENTS AVERAGE E/E' RATIO: 6.47
BOTTLE TYPE: ABNORMAL
BUN BLDA-MCNC: 51 MG/DL (ref 8–23)
BUN SERPL-MCNC: 48 MG/DL (ref 6–20)
BUN SERPL-MCNC: 51 MG/DL (ref 6–20)
BUN SERPL-MCNC: 55 MG/DL (ref 6–20)
BUN SERPL-MCNC: 56 MG/DL (ref 6–20)
BUN/CREAT SERPL: 25.1 (ref 7–25)
BUN/CREAT SERPL: 25.4 (ref 7–25)
BUN/CREAT SERPL: 26.3 (ref 7–25)
BUN/CREAT SERPL: 27.4 (ref 7–25)
CA-I BLDA-SCNC: 1.13 MMOL/L (ref 1.13–1.32)
CA-I BLDA-SCNC: 1.13 MMOL/L (ref 1.13–1.32)
CALCIUM SPEC-SCNC: 7.9 MG/DL (ref 8.6–10.5)
CALCIUM SPEC-SCNC: 8.1 MG/DL (ref 8.6–10.5)
CALCIUM SPEC-SCNC: 8.2 MG/DL (ref 8.6–10.5)
CALCIUM SPEC-SCNC: 8.6 MG/DL (ref 8.6–10.5)
CHLORIDE BLDA-SCNC: 111 MMOL/L (ref 98–107)
CHLORIDE BLDA-SCNC: 113 MMOL/L (ref 98–107)
CHLORIDE SERPL-SCNC: 103 MMOL/L (ref 98–107)
CHLORIDE SERPL-SCNC: 107 MMOL/L (ref 98–107)
CHLORIDE SERPL-SCNC: 112 MMOL/L (ref 98–107)
CHLORIDE SERPL-SCNC: 112 MMOL/L (ref 98–107)
CK SERPL-CCNC: 1567 U/L (ref 20–200)
CO2 BLDA-SCNC: 13.7 MMOL/L (ref 23–27)
CO2 SERPL-SCNC: 12.4 MMOL/L (ref 22–29)
CO2 SERPL-SCNC: 18.2 MMOL/L (ref 22–29)
CO2 SERPL-SCNC: 21.5 MMOL/L (ref 22–29)
CO2 SERPL-SCNC: 25.9 MMOL/L (ref 22–29)
CREAT BLDA-MCNC: 1.46 MG/DL (ref 0.6–1.3)
CREAT SERPL-MCNC: 1.91 MG/DL (ref 0.76–1.27)
CREAT SERPL-MCNC: 2.01 MG/DL (ref 0.76–1.27)
CREAT SERPL-MCNC: 2.01 MG/DL (ref 0.76–1.27)
CREAT SERPL-MCNC: 2.13 MG/DL (ref 0.76–1.27)
D-LACTATE SERPL-SCNC: 1.4 MMOL/L
D-LACTATE SERPL-SCNC: 1.9 MMOL/L (ref 0.5–2)
D-LACTATE SERPL-SCNC: 2.4 MMOL/L (ref 0.5–2)
D-LACTATE SERPL-SCNC: 3 MMOL/L (ref 0.5–2)
D-LACTATE SERPL-SCNC: 3.2 MMOL/L
D-LACTATE SERPL-SCNC: 3.5 MMOL/L (ref 0.5–2)
DEPRECATED RDW RBC AUTO: 40 FL (ref 37–54)
EGFRCR SERPLBLD CKD-EPI 2021: 36.8 ML/MIN/1.73
EGFRCR SERPLBLD CKD-EPI 2021: 39.4 ML/MIN/1.73
EGFRCR SERPLBLD CKD-EPI 2021: 39.4 ML/MIN/1.73
EGFRCR SERPLBLD CKD-EPI 2021: 41.9 ML/MIN/1.73
ERYTHROCYTE [DISTWIDTH] IN BLOOD BY AUTOMATED COUNT: 11.9 % (ref 12.3–15.4)
GLUCOSE BLDC GLUCOMTR-MCNC: 112 MG/DL (ref 70–99)
GLUCOSE BLDC GLUCOMTR-MCNC: 113 MG/DL (ref 70–99)
GLUCOSE BLDC GLUCOMTR-MCNC: 121 MG/DL (ref 70–99)
GLUCOSE BLDC GLUCOMTR-MCNC: 132 MG/DL (ref 70–99)
GLUCOSE BLDC GLUCOMTR-MCNC: 137 MG/DL (ref 70–99)
GLUCOSE BLDC GLUCOMTR-MCNC: 138 MG/DL (ref 70–99)
GLUCOSE BLDC GLUCOMTR-MCNC: 140 MG/DL (ref 70–99)
GLUCOSE BLDC GLUCOMTR-MCNC: 148 MG/DL (ref 70–99)
GLUCOSE BLDC GLUCOMTR-MCNC: 159 MG/DL (ref 70–99)
GLUCOSE BLDC GLUCOMTR-MCNC: 161 MG/DL (ref 70–99)
GLUCOSE BLDC GLUCOMTR-MCNC: 198 MG/DL (ref 70–99)
GLUCOSE BLDC GLUCOMTR-MCNC: 235 MG/DL (ref 70–99)
GLUCOSE BLDC GLUCOMTR-MCNC: 235 MG/DL (ref 70–99)
GLUCOSE BLDC GLUCOMTR-MCNC: 306 MG/DL (ref 70–99)
GLUCOSE BLDC GLUCOMTR-MCNC: 351 MG/DL (ref 70–99)
GLUCOSE BLDC GLUCOMTR-MCNC: 439 MG/DL (ref 70–99)
GLUCOSE BLDC GLUCOMTR-MCNC: 476 MG/DL (ref 70–99)
GLUCOSE BLDC GLUCOMTR-MCNC: 480 MG/DL (ref 70–99)
GLUCOSE BLDC GLUCOMTR-MCNC: 509 MG/DL (ref 70–99)
GLUCOSE BLDC GLUCOMTR-MCNC: 541 MG/DL (ref 70–99)
GLUCOSE BLDC GLUCOMTR-MCNC: 589 MG/DL (ref 70–99)
GLUCOSE BLDC GLUCOMTR-MCNC: 596 MG/DL (ref 70–99)
GLUCOSE SERPL-MCNC: 135 MG/DL (ref 65–99)
GLUCOSE SERPL-MCNC: 183 MG/DL (ref 65–99)
GLUCOSE SERPL-MCNC: 434 MG/DL (ref 65–99)
GLUCOSE SERPL-MCNC: 614 MG/DL (ref 65–99)
HCO3 BLDA-SCNC: 14.2 MMOL/L (ref 22–26)
HCO3 BLDA-SCNC: 23.6 MMOL/L (ref 22–26)
HCT VFR BLD AUTO: 40 % (ref 37.5–51)
HCT VFR BLD CALC: 39 % (ref 38–51)
HCT VFR BLD CALC: 43 % (ref 38–51)
HEMODILUTION: NO
HEMODILUTION: NO
HGB BLD-MCNC: 14.1 G/DL (ref 13–17.7)
HGB BLDA-MCNC: 13.3 G/DL (ref 12–18)
HGB BLDA-MCNC: 14.7 G/DL (ref 12–18)
INHALED O2 CONCENTRATION: 30 %
INHALED O2 CONCENTRATION: 30 %
L PNEUMO1 AG UR QL IA: NEGATIVE
LEFT ATRIUM VOLUME INDEX: 12.1 ML/M2
Lab: ABNORMAL
MAGNESIUM SERPL-MCNC: 2.8 MG/DL (ref 1.6–2.6)
MAGNESIUM SERPL-MCNC: 2.8 MG/DL (ref 1.6–2.6)
MAGNESIUM SERPL-MCNC: 3 MG/DL (ref 1.6–2.6)
MAGNESIUM SERPL-MCNC: 3.7 MG/DL (ref 1.6–2.6)
MCH RBC QN AUTO: 32.3 PG (ref 26.6–33)
MCHC RBC AUTO-ENTMCNC: 35.3 G/DL (ref 31.5–35.7)
MCV RBC AUTO: 91.7 FL (ref 79–97)
MODALITY: ABNORMAL
MODALITY: ABNORMAL
NOTIFIED WHO: ABNORMAL
NOTIFIED WHO: ABNORMAL
PCO2 BLDA: 31.4 MM HG (ref 35–45)
PCO2 BLDA: 36.8 MM HG (ref 35–45)
PEEP RESPIRATORY: 5 CM[H2O]
PEEP RESPIRATORY: 5 CM[H2O]
PH BLDA: 7.26 PH UNITS (ref 7.35–7.45)
PH BLDA: 7.42 PH UNITS (ref 7.35–7.45)
PHOSPHATE SERPL-MCNC: 0.3 MG/DL (ref 2.5–4.5)
PHOSPHATE SERPL-MCNC: 0.6 MG/DL (ref 2.5–4.5)
PHOSPHATE SERPL-MCNC: 0.8 MG/DL (ref 2.5–4.5)
PHOSPHATE SERPL-MCNC: 2.8 MG/DL (ref 2.5–4.5)
PHOSPHATE SERPL-MCNC: <0.3 MG/DL (ref 2.5–4.5)
PLATELET # BLD AUTO: 112 10*3/MM3 (ref 140–450)
PMV BLD AUTO: 12.8 FL (ref 6–12)
PO2 BLD: 246 MM[HG] (ref 0–500)
PO2 BLD: 327 MM[HG] (ref 0–500)
PO2 BLDA: 73.7 MM HG (ref 80–100)
PO2 BLDA: 98.2 MM HG (ref 80–100)
POTASSIUM BLDA-SCNC: 3.1 MMOL/L (ref 3.5–5)
POTASSIUM BLDA-SCNC: 3.6 MMOL/L (ref 3.5–5)
POTASSIUM SERPL-SCNC: 3.2 MMOL/L (ref 3.5–5.2)
POTASSIUM SERPL-SCNC: 3.4 MMOL/L (ref 3.5–5.2)
POTASSIUM SERPL-SCNC: 3.6 MMOL/L (ref 3.5–5.2)
POTASSIUM SERPL-SCNC: 3.6 MMOL/L (ref 3.5–5.2)
POTASSIUM SERPL-SCNC: 4.1 MMOL/L (ref 3.5–5.2)
PSV: 8 CMH2O
RBC # BLD AUTO: 4.36 10*6/MM3 (ref 4.14–5.8)
READ BACK: YES
READ BACK: YES
RESPIRATORY RATE: 28
S PNEUM AG SPEC QL LA: NEGATIVE
SAO2 % BLDCOA: 94.9 % (ref 95–99)
SAO2 % BLDCOA: 96.7 % (ref 95–99)
SODIUM BLD-SCNC: 142 MMOL/L (ref 131–143)
SODIUM BLD-SCNC: 149 MMOL/L (ref 131–143)
SODIUM SERPL-SCNC: 145 MMOL/L (ref 136–145)
SODIUM SERPL-SCNC: 147 MMOL/L (ref 136–145)
TSH SERPL DL<=0.05 MIU/L-ACNC: 0.65 UIU/ML (ref 0.27–4.2)
VANCOMYCIN SERPL-MCNC: 18.56 MCG/ML (ref 5–40)
VENTILATOR MODE: ABNORMAL
VENTILATOR MODE: AC
VT ON VENT VENT: 400 ML
WBC NRBC COR # BLD AUTO: 12.93 10*3/MM3 (ref 3.4–10.8)
WHOLE BLOOD HOLD SPECIMEN: NORMAL

## 2024-09-28 PROCEDURE — 82948 REAGENT STRIP/BLOOD GLUCOSE: CPT

## 2024-09-28 PROCEDURE — 25010000002 POTASSIUM CHLORIDE 10 MEQ/100ML SOLUTION: Performed by: INTERNAL MEDICINE

## 2024-09-28 PROCEDURE — 25010000002 FENTANYL CITRATE (PF) 50 MCG/ML SOLUTION: Performed by: INTERNAL MEDICINE

## 2024-09-28 PROCEDURE — 80048 BASIC METABOLIC PNL TOTAL CA: CPT | Performed by: EMERGENCY MEDICINE

## 2024-09-28 PROCEDURE — 25010000002 VANCOMYCIN 5 G RECONSTITUTED SOLUTION: Performed by: STUDENT IN AN ORGANIZED HEALTH CARE EDUCATION/TRAINING PROGRAM

## 2024-09-28 PROCEDURE — 82803 BLOOD GASES ANY COMBINATION: CPT

## 2024-09-28 PROCEDURE — 80047 BASIC METABLC PNL IONIZED CA: CPT

## 2024-09-28 PROCEDURE — 93306 TTE W/DOPPLER COMPLETE: CPT

## 2024-09-28 PROCEDURE — 84443 ASSAY THYROID STIM HORMONE: CPT | Performed by: INTERNAL MEDICINE

## 2024-09-28 PROCEDURE — 99291 CRITICAL CARE FIRST HOUR: CPT | Performed by: INTERNAL MEDICINE

## 2024-09-28 PROCEDURE — 94799 UNLISTED PULMONARY SVC/PX: CPT

## 2024-09-28 PROCEDURE — 25010000002 CEFEPIME PER 500 MG: Performed by: STUDENT IN AN ORGANIZED HEALTH CARE EDUCATION/TRAINING PROGRAM

## 2024-09-28 PROCEDURE — 85027 COMPLETE CBC AUTOMATED: CPT | Performed by: PHYSICIAN ASSISTANT

## 2024-09-28 PROCEDURE — 94003 VENT MGMT INPAT SUBQ DAY: CPT

## 2024-09-28 PROCEDURE — 80202 ASSAY OF VANCOMYCIN: CPT | Performed by: STUDENT IN AN ORGANIZED HEALTH CARE EDUCATION/TRAINING PROGRAM

## 2024-09-28 PROCEDURE — 83605 ASSAY OF LACTIC ACID: CPT

## 2024-09-28 PROCEDURE — 0 DEXTROSE 5 % SOLUTION: Performed by: INTERNAL MEDICINE

## 2024-09-28 PROCEDURE — 83735 ASSAY OF MAGNESIUM: CPT | Performed by: EMERGENCY MEDICINE

## 2024-09-28 PROCEDURE — 25010000002 HYDROCORTISONE SOD SUC (PF) 100 MG RECONSTITUTED SOLUTION: Performed by: NURSE PRACTITIONER

## 2024-09-28 PROCEDURE — 25810000003 SODIUM CHLORIDE 0.9 % SOLUTION: Performed by: STUDENT IN AN ORGANIZED HEALTH CARE EDUCATION/TRAINING PROGRAM

## 2024-09-28 PROCEDURE — 82550 ASSAY OF CK (CPK): CPT | Performed by: INTERNAL MEDICINE

## 2024-09-28 PROCEDURE — 36600 WITHDRAWAL OF ARTERIAL BLOOD: CPT

## 2024-09-28 PROCEDURE — 82330 ASSAY OF CALCIUM: CPT

## 2024-09-28 PROCEDURE — 84100 ASSAY OF PHOSPHORUS: CPT | Performed by: EMERGENCY MEDICINE

## 2024-09-28 PROCEDURE — 25810000003 DEXTROSE 5% IN LACTATED RINGERS PER 1000 ML: Performed by: NURSE PRACTITIONER

## 2024-09-28 PROCEDURE — 63710000001 INSULIN GLARGINE PER 5 UNITS: Performed by: INTERNAL MEDICINE

## 2024-09-28 PROCEDURE — 80051 ELECTROLYTE PANEL: CPT

## 2024-09-28 PROCEDURE — 93306 TTE W/DOPPLER COMPLETE: CPT | Performed by: INTERNAL MEDICINE

## 2024-09-28 PROCEDURE — 25010000002 HEPARIN (PORCINE) PER 1000 UNITS: Performed by: INTERNAL MEDICINE

## 2024-09-28 PROCEDURE — 25010000002 PROPOFOL 10 MG/ML EMULSION: Performed by: PHYSICIAN ASSISTANT

## 2024-09-28 PROCEDURE — 84100 ASSAY OF PHOSPHORUS: CPT | Performed by: INTERNAL MEDICINE

## 2024-09-28 PROCEDURE — 25010000002 POTASSIUM CHLORIDE PER 2 MEQ: Performed by: EMERGENCY MEDICINE

## 2024-09-28 PROCEDURE — 25010000002 HYDROCORTISONE SOD SUC (PF) 100 MG RECONSTITUTED SOLUTION: Performed by: INTERNAL MEDICINE

## 2024-09-28 PROCEDURE — 25810000003 SODIUM CHLORIDE 0.9 % SOLUTION: Performed by: INTERNAL MEDICINE

## 2024-09-28 PROCEDURE — 84132 ASSAY OF SERUM POTASSIUM: CPT | Performed by: INTERNAL MEDICINE

## 2024-09-28 PROCEDURE — 87040 BLOOD CULTURE FOR BACTERIA: CPT | Performed by: NURSE PRACTITIONER

## 2024-09-28 PROCEDURE — 63710000001 INSULIN REGULAR HUMAN PER 5 UNITS: Performed by: INTERNAL MEDICINE

## 2024-09-28 RX ORDER — MIDODRINE HYDROCHLORIDE 10 MG/1
10 TABLET ORAL EVERY 8 HOURS
Status: DISCONTINUED | OUTPATIENT
Start: 2024-09-28 | End: 2024-09-29

## 2024-09-28 RX ORDER — IBUPROFEN 600 MG/1
1 TABLET ORAL
Status: DISCONTINUED | OUTPATIENT
Start: 2024-09-28 | End: 2024-09-29

## 2024-09-28 RX ORDER — DEXTROSE MONOHYDRATE 25 G/50ML
25 INJECTION, SOLUTION INTRAVENOUS
Status: DISCONTINUED | OUTPATIENT
Start: 2024-09-28 | End: 2024-09-29

## 2024-09-28 RX ORDER — FENTANYL CITRATE 50 UG/ML
25 INJECTION, SOLUTION INTRAMUSCULAR; INTRAVENOUS ONCE
Status: COMPLETED | OUTPATIENT
Start: 2024-09-28 | End: 2024-09-28

## 2024-09-28 RX ORDER — DEXTROSE, SODIUM CHLORIDE, SODIUM LACTATE, POTASSIUM CHLORIDE, AND CALCIUM CHLORIDE 5; .6; .31; .03; .02 G/100ML; G/100ML; G/100ML; G/100ML; G/100ML
100 INJECTION, SOLUTION INTRAVENOUS CONTINUOUS
Status: DISCONTINUED | OUTPATIENT
Start: 2024-09-28 | End: 2024-09-29

## 2024-09-28 RX ORDER — FENTANYL/ROPIVACAINE/NS/PF 2-625MCG/1
15 PLASTIC BAG, INJECTION (ML) EPIDURAL
Status: COMPLETED | OUTPATIENT
Start: 2024-09-28 | End: 2024-09-28

## 2024-09-28 RX ORDER — VANCOMYCIN/0.9 % SOD CHLORIDE 1.5G/250ML
1500 PLASTIC BAG, INJECTION (ML) INTRAVENOUS EVERY 24 HOURS
Status: DISCONTINUED | OUTPATIENT
Start: 2024-09-28 | End: 2024-09-29

## 2024-09-28 RX ORDER — NICOTINE POLACRILEX 4 MG
15 LOZENGE BUCCAL
Status: DISCONTINUED | OUTPATIENT
Start: 2024-09-28 | End: 2024-09-29

## 2024-09-28 RX ORDER — POTASSIUM CHLORIDE 7.45 MG/ML
10 INJECTION INTRAVENOUS
Status: DISPENSED | OUTPATIENT
Start: 2024-09-28 | End: 2024-09-28

## 2024-09-28 RX ORDER — DEXMEDETOMIDINE HYDROCHLORIDE 4 UG/ML
.2-1.5 INJECTION, SOLUTION INTRAVENOUS
Status: DISCONTINUED | OUTPATIENT
Start: 2024-09-28 | End: 2024-09-29

## 2024-09-28 RX ORDER — HEPARIN SODIUM 5000 [USP'U]/ML
5000 INJECTION, SOLUTION INTRAVENOUS; SUBCUTANEOUS EVERY 12 HOURS SCHEDULED
Status: DISCONTINUED | OUTPATIENT
Start: 2024-09-28 | End: 2024-09-30

## 2024-09-28 RX ADMIN — SODIUM CHLORIDE 250 ML/HR: 4.5 INJECTION, SOLUTION INTRAVENOUS at 00:20

## 2024-09-28 RX ADMIN — FAMOTIDINE 20 MG: 10 INJECTION INTRAVENOUS at 20:06

## 2024-09-28 RX ADMIN — SODIUM BICARBONATE 150 MEQ: 84 INJECTION INTRAVENOUS at 04:30

## 2024-09-28 RX ADMIN — NOREPINEPHRINE BITARTRATE 0.08 MCG/KG/MIN: 0.03 INJECTION, SOLUTION INTRAVENOUS at 05:21

## 2024-09-28 RX ADMIN — POTASSIUM PHOSPHATES 15 MMOL: 236; 224 INJECTION, SOLUTION INTRAVENOUS at 11:28

## 2024-09-28 RX ADMIN — POTASSIUM PHOSPHATES 15 MMOL: 236; 224 INJECTION, SOLUTION INTRAVENOUS at 14:39

## 2024-09-28 RX ADMIN — POTASSIUM CHLORIDE 10 MEQ: 7.46 INJECTION, SOLUTION INTRAVENOUS at 06:50

## 2024-09-28 RX ADMIN — HYDROCORTISONE SODIUM SUCCINATE 100 MG: 100 INJECTION, POWDER, FOR SOLUTION INTRAMUSCULAR; INTRAVENOUS at 07:59

## 2024-09-28 RX ADMIN — POTASSIUM CHLORIDE, DEXTROSE MONOHYDRATE AND SODIUM CHLORIDE 150 ML/HR: 300; 5; 450 INJECTION, SOLUTION INTRAVENOUS at 07:22

## 2024-09-28 RX ADMIN — INSULIN HUMAN 2 UNITS: 100 INJECTION, SOLUTION PARENTERAL at 23:29

## 2024-09-28 RX ADMIN — SODIUM CHLORIDE, SODIUM LACTATE, POTASSIUM CHLORIDE, CALCIUM CHLORIDE AND DEXTROSE MONOHYDRATE 100 ML/HR: 5; 600; 310; 30; 20 INJECTION, SOLUTION INTRAVENOUS at 09:26

## 2024-09-28 RX ADMIN — HEPARIN SODIUM 5000 UNITS: 5000 INJECTION INTRAVENOUS; SUBCUTANEOUS at 20:06

## 2024-09-28 RX ADMIN — CHLORHEXIDINE GLUCONATE 15 ML: 1.2 RINSE ORAL at 20:07

## 2024-09-28 RX ADMIN — CEFEPIME 2000 MG: 2 INJECTION, POWDER, FOR SOLUTION INTRAVENOUS at 07:59

## 2024-09-28 RX ADMIN — Medication 10 ML: at 20:06

## 2024-09-28 RX ADMIN — FENTANYL CITRATE 25 MCG: 50 INJECTION, SOLUTION INTRAMUSCULAR; INTRAVENOUS at 05:42

## 2024-09-28 RX ADMIN — HYDROCORTISONE SODIUM SUCCINATE 50 MG: 100 INJECTION, POWDER, FOR SOLUTION INTRAMUSCULAR; INTRAVENOUS at 14:40

## 2024-09-28 RX ADMIN — DEXMEDETOMIDINE HYDROCHLORIDE 0.2 MCG/KG/HR: 4 INJECTION, SOLUTION INTRAVENOUS at 14:39

## 2024-09-28 RX ADMIN — HYDROCORTISONE SODIUM SUCCINATE 25 MG: 100 INJECTION, POWDER, FOR SOLUTION INTRAMUSCULAR; INTRAVENOUS at 22:48

## 2024-09-28 RX ADMIN — VANCOMYCIN HYDROCHLORIDE 1250 MG: 5 INJECTION, POWDER, LYOPHILIZED, FOR SOLUTION INTRAVENOUS at 04:12

## 2024-09-28 RX ADMIN — POTASSIUM PHOSPHATES 15 MMOL: 236; 224 INJECTION, SOLUTION INTRAVENOUS at 07:09

## 2024-09-28 RX ADMIN — PROPOFOL 10 MCG/KG/MIN: 10 INJECTION, EMULSION INTRAVENOUS at 06:00

## 2024-09-28 RX ADMIN — POTASSIUM CHLORIDE 10 MEQ: 7.46 INJECTION, SOLUTION INTRAVENOUS at 07:31

## 2024-09-28 RX ADMIN — POTASSIUM CHLORIDE 10 MEQ: 7.46 INJECTION, SOLUTION INTRAVENOUS at 09:25

## 2024-09-28 RX ADMIN — INSULIN GLARGINE 8 UNITS: 100 INJECTION, SOLUTION SUBCUTANEOUS at 19:11

## 2024-09-28 RX ADMIN — DEXMEDETOMIDINE HYDROCHLORIDE 0.2 MCG/KG/HR: 4 INJECTION, SOLUTION INTRAVENOUS at 22:48

## 2024-09-28 RX ADMIN — INSULIN HUMAN 18.3 UNITS/HR: 1 INJECTION, SOLUTION INTRAVENOUS at 06:39

## 2024-09-28 RX ADMIN — POTASSIUM CHLORIDE AND SODIUM CHLORIDE 250 ML/HR: 450; 150 INJECTION, SOLUTION INTRAVENOUS at 00:53

## 2024-09-28 RX ADMIN — HEPARIN SODIUM 5000 UNITS: 5000 INJECTION INTRAVENOUS; SUBCUTANEOUS at 09:26

## 2024-09-28 RX ADMIN — SODIUM CHLORIDE, SODIUM LACTATE, POTASSIUM CHLORIDE, CALCIUM CHLORIDE AND DEXTROSE MONOHYDRATE 100 ML/HR: 5; 600; 310; 30; 20 INJECTION, SOLUTION INTRAVENOUS at 19:15

## 2024-09-28 RX ADMIN — POTASSIUM CHLORIDE AND SODIUM CHLORIDE 250 ML/HR: 450; 150 INJECTION, SOLUTION INTRAVENOUS at 04:33

## 2024-09-28 RX ADMIN — FAMOTIDINE 20 MG: 10 INJECTION INTRAVENOUS at 08:56

## 2024-09-28 RX ADMIN — VANCOMYCIN HYDROCHLORIDE 1500 MG: 5 INJECTION, POWDER, LYOPHILIZED, FOR SOLUTION INTRAVENOUS at 16:12

## 2024-09-28 NOTE — ED PROVIDER NOTES
"Time: 10:40 PM EDT  Date of encounter:  9/27/2024  Independent Historian/Clinical History and Information was obtained by:   EMS    History is limited by: Altered Mental Status    Chief Complaint: Unresponsive      History of Present Illness:  Patient is a 49 y.o. year old male who presents to the emergency department for evaluation of unresponsive.  Patient has been missing for approximately 24 hours and wife and [employer] supervisor were unable to locate him.  Bystander from a truck stop called 911 to assist with the patient that appeared to be unresponsive in the back of his truck.  Upon EMS arrival patient was unresponsive and cold to the touch.  They do report a low axillary temperature.  Due to patient being unresponsive he was intubated in the field.  He was also found to be hypotensive.      Patient Care Team  Primary Care Provider: Provider, No Known    Past Medical History:     No Known Allergies  Past Medical History:   Diagnosis Date    Sleep apnea      Past Surgical History:   Procedure Laterality Date    WISDOM TOOTH EXTRACTION       History reviewed. No pertinent family history.    Home Medications:  Prior to Admission medications    Not on File        Social History:   Social History     Tobacco Use    Smoking status: Unknown   Vaping Use    Vaping status: Never Used   Substance Use Topics    Drug use: Defer         Review of Systems:  Review of Systems   Unable to perform ROS: Patient unresponsive        Physical Exam:  /52   Pulse 108   Temp 98.2 °F (36.8 °C)   Resp 20   Ht 182.9 cm (72\")   Wt 94.7 kg (208 lb 12.4 oz)   SpO2 97%   BMI 28.32 kg/m²     Physical Exam  Vitals and nursing note reviewed.   Constitutional:       General: He is in acute distress.      Appearance: He is ill-appearing and toxic-appearing.   HENT:      Head: Normocephalic and atraumatic.      Jaw: There is normal jaw occlusion.      Mouth/Throat:      Mouth: Mucous membranes are dry.   Eyes:      General: " Lids are normal.   Cardiovascular:      Rate and Rhythm: Regular rhythm. Bradycardia present.      Pulses: Normal pulses.      Heart sounds: Normal heart sounds.   Pulmonary:      Effort: Respiratory distress present.      Breath sounds: No wheezing or rhonchi.      Comments: Intubated by EMS  Abdominal:      General: Abdomen is flat.      Palpations: Abdomen is soft.      Tenderness: There is no abdominal tenderness. There is no guarding or rebound.   Musculoskeletal:         General: Normal range of motion.      Cervical back: Normal range of motion and neck supple.      Right lower leg: No edema.      Left lower leg: No edema.   Skin:     General: Skin is dry.      Comments: Cold to touch   Neurological:      Comments: Unresponsive   Psychiatric:      Comments: Unable to test                  Procedures:  Procedures      Medical Decision Making:      Comorbidities that affect care:    Unknown, unable to obtain    External Notes reviewed:    None      The following orders were placed and all results were independently analyzed by me:  Orders Placed This Encounter   Procedures    Blood Culture - Blood,    Blood Culture - Blood,    S. Pneumo Ag Urine or CSF - Urine, Urine, Clean Catch    Respiratory Culture - Sputum, ET Suction    Respiratory Panel PCR w/COVID-19(SARS-CoV-2) LETITIA/QUITA/ERIC/PAD/COR/JUAN CARLOS In-House, NP Swab in UTM/VTM, 2 HR TAT - Swab, Nasopharynx    MRSA Screen, PCR (Inpatient) - Swab, Nares    Legionella Antigen, Urine - Urine, Urine, Clean Catch    XR Chest 1 View    CT Head Without Contrast    [place] Draw    Comprehensive Metabolic Panel    BNP    Single High Sensitivity Troponin T    Arterial Blood Gas + Electrolytes    Carboxyhemoglobin    CBC Auto Differential    Scan Slide    Blood Gas, Arterial -    Acetone    Manual Differential    Osmolality, Serum    Lactic Acid, Plasma    Phosphorus    Magnesium    Osmolality, Serum    Hemoglobin A1c    Basic Metabolic Panel    Magnesium    Phosphorus    CBC  Auto Differential    Procalcitonin    CBC (No Diff)    Arterial Blood Gas,H&H,Lytes,Lactate    Blood Gas, Arterial -    Arterial Blood Gas,H&H,Lytes,Lactate    Vancomycin, Random    Urinalysis With Culture If Indicated - Urine, Clean Catch    NPO Diet NPO Type: Strict NPO    Undress & Gown    Vital Signs    Vital Signs    Strict Intake & Output    Daily Weights    Corrected Serum Sodium = Measured Sodium + [1.6 x ((Glucose - 100)/100)]    Do NOT Discontinue Insulin Infusion Until 2 Hours After First Dose of Basal SQ Insulin    Prior to Initiating Glucommander™, Ensure All Prior Insulin Orders Are Discontinued    Do Not Start Insulin Infusion if Potassium < 3.3    Use a Dedicated Line for Insulin Infusion (If Possible).  May Use a Carrier Fluid of NS at KVO Rate if Insulin Rate is Insufficient to Maintain IV Patency.  Prime IV Line With Insulin Infusion    Glucommander Must Be Discontinued if Insulin Infusion is Discontinued.  If Insulin Infusion is Restarted, Previous Glucommander Settings Must Be Discontinued and Re-Entered From New Order    Once DKA Meets Resolution Criteria - Call Provider for Transition Orders From IV to SQ Insulin    Utilize the Start Meal Feature / Meal Bolus Feature in Glucommander if Patient Starts a Diet or Bolus Tube Feedings    Notify Provider - Insulin Infusion    RN to Release PRN POC Glucose Orders Per Glucommander    RN to Order STAT Glucose For Any POC Glucose <10 or >600    If Insulin Infusion is Paused - Follow Glucommander Instructions    DKA / HHS Patients - Phosphorus of 1 mg/dL or Higher Does NOT Require Replacement (While Insulin Infusing)    Vital Signs Every Hour and Per Hospital Policy Based on Patient Condition    Telemetry - Place Orders & Notify Provider of Results When Patient Experiences Acute Chest Pain, Dysrhythmia or Respiratory Distress    Continuous Pulse Oximetry    Height & Weight    Daily Weights    Intake & Output    Oral Care - Patient Not on NPPV & Not  Intubated    Target Arousal Level RASS 0 to -2    Use Mobility Guidelines for Advancement of Activity    Saline Lock & Maintain IV Access    Place Sequential Compression Device    Maintain Sequential Compression Device    Elevate HOB    Oral Care & Teeth Brushing - Intubated Patient    Subglottic Suctioning Must Be Done Every 6 Hours    Spontaneous Awakening Trial    RN May Place Order For ABG As Needed for Respiratory Distress    Code Status and Medical Interventions: CPR (Attempt to Resuscitate); Full Support    Pulmonology (on-call MD unless specified)    Inpatient Hospitalist Consult    Inpatient Diabetes Educator Consult    Patient is on Glucommander    Oxygen Therapy- Nasal Cannula; Titrate 1-6 LPM Per SpO2; 90 - 95%    Spontaneous Breathing Trial    Ventilator - Vent Mode: AC/VC; Rate: Other; Rate: 28; FiO2: Titrate Per SpO2; Titrate Oxygen for SpO2: 90 - 95%; PEEP: 5; Tidal Volume: mL; TV: 400    SLP Consult: Eval & Treat RN Dysphagia Screen Failed    POC Chem Panel    POC Lactate    POC Glucose PRN    POC Glucose Once    POC Glucose Once    POC Glucose Once    POC Chem Panel    POC Lactate    POC Glucose Once    POC Glucose Once    POC Glucose Once    ECG 12 Lead ED Triage Standing Order; SOA    Insert Peripheral IV    Insert Peripheral IV x2    Insert Peripheral IV    Inpatient Admission    Restraints Non-Violent or Non-Self Destructive    CBC & Differential    Green Top (Gel)    Lavender Top    Gold Top - SST    Light Blue Top    CBC & Differential       Medications Given in the Emergency Department:  Medications   sodium chloride 0.9 % flush 10 mL (has no administration in time range)   norepinephrine (LEVOPHED) 8 mg in 250 mL NS infusion (premix) (0.34 mcg/kg/min × 94.7 kg Intravenous New Bag 9/27/24 2156)   sodium chloride 0.9 % flush 10 mL (10 mL Intravenous Given 9/27/24 2203)   sodium chloride 0.9 % flush 10 mL (has no administration in time range)   sodium chloride 0.9 % infusion 40 mL (has no  administration in time range)   dextrose (GLUTOSE) oral gel 15 g (has no administration in time range)   dextrose (D50W) (25 g/50 mL) IV injection 10-50 mL (has no administration in time range)   glucagon (GLUCAGEN) injection 1 mg (has no administration in time range)   sodium chloride 0.9 % bolus (1,000 mL/hr Intravenous New Bag 9/27/24 1550)   sodium chloride 0.9 % infusion (has no administration in time range)   sodium chloride 0.9 % with KCl 20 mEq/L infusion (has no administration in time range)   sodium chloride 0.9 % with KCl 40 mEq/L infusion (has no administration in time range)   dextrose 5 % and sodium chloride 0.9 % infusion (has no administration in time range)   dextrose 5 % and sodium chloride 0.9 % with KCl 20 mEq/L infusion (has no administration in time range)   dextrose 5 % and sodium chloride 0.9 % with KCl 40 mEq/L infusion (has no administration in time range)   sodium chloride 0.45 % infusion (250 mL/hr Intravenous New Bag 9/27/24 2021)   sodium chloride 0.45 % with KCl 20 mEq/L infusion (250 mL/hr Intravenous New Bag 9/27/24 1813)   sodium chloride 0.45 % 1,000 mL with potassium chloride 40 mEq infusion (has no administration in time range)   dextrose 5 % and sodium chloride 0.45 % infusion (has no administration in time range)   dextrose 5 % and sodium chloride 0.45 % with KCl 20 mEq/L infusion (has no administration in time range)   dextrose 5 % and sodium chloride 0.45 % with KCl 40 mEq/L infusion (has no administration in time range)   insulin regular 1 unit/mL in 0.9% sodium chloride (Glucommander) (7.7 Units/hr Intravenous Rate Change (DUAL SIGN) 9/27/24 2230)   Potassium Replacement - Follow Nurse / BPA Driven Protocol (has no administration in time range)   Magnesium Standard Dose Replacement - Follow Nurse / BPA Driven Protocol (has no administration in time range)   Phosphorus Replacement - Follow Nurse / BPA Driven Protocol (has no administration in time range)   Calcium  Replacement - Follow Nurse / BPA Driven Protocol (has no administration in time range)   nitroglycerin (NITROSTAT) SL tablet 0.4 mg (has no administration in time range)   sodium chloride 0.9 % flush 10 mL (10 mL Intravenous Given 9/27/24 2042)   sodium chloride 0.9 % flush 10 mL (has no administration in time range)   sodium chloride 0.9 % infusion 40 mL (has no administration in time range)   sennosides-docusate (PERICOLACE) 8.6-50 MG per tablet 2 tablet (2 tablets Oral Not Given 9/27/24 2032)     And   polyethylene glycol (MIRALAX) packet 17 g (has no administration in time range)     And   bisacodyl (DULCOLAX) EC tablet 5 mg (has no administration in time range)     And   bisacodyl (DULCOLAX) suppository 10 mg (has no administration in time range)   acetaminophen (TYLENOL) tablet 650 mg (has no administration in time range)     Or   acetaminophen (TYLENOL) suppository 650 mg (has no administration in time range)   ondansetron ODT (ZOFRAN-ODT) disintegrating tablet 4 mg (has no administration in time range)     Or   ondansetron (ZOFRAN) injection 4 mg (has no administration in time range)   fentaNYL 1000 mcg in 100 mL NS infusion ( Intravenous Not Given 9/27/24 1811)   propofol (DIPRIVAN) infusion 10 mg/mL 100 mL ( Intravenous Not Given 9/27/24 1811)   chlorhexidine (PERIDEX) 0.12 % solution 15 mL (15 mL Mouth/Throat Given 9/27/24 2030)   famotidine (PEPCID) injection 20 mg (20 mg Intravenous Given 9/27/24 2040)   vasopressin (PITRESSIN) 20 units in 100 mL NS infusion (0.04 Units/min Intravenous Currently Infusing 9/27/24 2139)   sodium bicarbonate 150 mEq/1000 mL D5W infusion (150 mEq Intravenous New Bag 9/27/24 1948)   calcium gluconate 2000-675 MG/100ML NACL IVPB (2,000 mg Intravenous Incomplete 9/27/24 2216)   Pharmacy Consult - Pharmacy to dose (has no administration in time range)   cefepime 2000 mg IVPB in 100 mL NS (VTB) (has no administration in time range)   Hydrocortisone Sod Suc (PF) (Solu-CORTEF)  injection 100 mg (has no administration in time range)   albumin human 25 % IV SOLN 25 g (has no administration in time range)   vancomycin 1250 mg/250 mL 0.9% NS IVPB ([facility]) (has no administration in time range)   sodium bicarbonate injection 8.4% 50 mEq (50 mEq Intravenous Given 9/27/24 1410)   sodium bicarbonate injection 8.4% 50 mEq (50 mEq Intravenous Given 9/27/24 1407)   sodium chloride 0.9 % bolus 2,000 mL (0 mL Intravenous Stopped 9/27/24 1525)   cefepime 2000 mg IVPB in 100 mL NS (VTB) (0 mg Intravenous Stopped 9/27/24 1549)   vancomycin IVPB 2000 mg in 0.9% Sodium Chloride 500 mL (2,000 mg Intravenous New Bag 9/27/24 1624)   magnesium sulfate 2g/50 mL (PREMIX) infusion (2 g Intravenous New Bag 9/27/24 2030)   sodium bicarbonate injection 8.4% 100 mEq (100 mEq Intravenous Given 9/27/24 1949)   calcium gluconate 1000 Mg/50ml 0.675% NaCl IV SOLN (1,000 mg Intravenous New Bag 9/27/24 2146)   sodium bicarbonate injection 8.4% 100 mEq (100 mEq Intravenous Given 9/27/24 2216)        ED Course:    ED Course as of 09/27/24 2246   Fri Sep 27, 2024   2241 My interpretation of the EKG shows sinus bradycardia with a rate of 54 bpm, prolonged QT at 526, no acute ischemia, very obvious Bland waves present. [TC]      ED Course User Index  [TC] Bobby Cowart MD       Labs:    Lab Results (last 24 hours)       Procedure Component Value Units Date/Time    CBC & Differential [[id number]]  (Abnormal) Collected: 09/27/24 1349    Specimen: Blood Updated: 09/27/24 1456    Narrative:      The following orders were created for panel order CBC & Differential.  Procedure                               Abnormality         Status                     ---------                               -----------         ------                     CBC Auto Differential[[id number]]        Abnormal            Final result               Scan Slide[[id number]]                                       Final result                 Please view results for  these tests on the individual orders.    Comprehensive Metabolic Panel [[id number]]  (Abnormal) Collected: 09/27/24 1349    Specimen: Blood Updated: 09/27/24 1436     Glucose 954 mg/dL      BUN 55 mg/dL      Creatinine 2.44 mg/dL      Sodium 135 mmol/L      Potassium 5.3 mmol/L      Comment: Slight hemolysis detected by analyzer. Result may be falsely elevated.        Chloride 94 mmol/L      CO2 4.7 mmol/L      Calcium 8.8 mg/dL      Total Protein 6.4 g/dL      Albumin 3.5 g/dL      ALT (SGPT) 20 U/L      AST (SGOT) 17 U/L      Alkaline Phosphatase 199 U/L      Total Bilirubin 0.3 mg/dL      Globulin 2.9 gm/dL      A/G Ratio 1.2 g/dL      BUN/Creatinine Ratio 22.5     Anion Gap 36.3 mmol/L      eGFR 31.2 mL/min/1.73     Narrative:      GFR Normal >60  Chronic Kidney Disease <60  Kidney Failure <15      BNP [[id number]]  (Normal) Collected: 09/27/24 1349    Specimen: Blood Updated: 09/27/24 1414     proBNP 99.4 pg/mL     Narrative:      This assay is used as an aid in the diagnosis of individuals suspected of having heart failure. It can be used as an aid in the diagnosis of acute decompensated heart failure (ADHF) in patients presenting with signs and symptoms of ADHF to the emergency department (ED). In addition, NT-proBNP of <300 pg/mL indicates ADHF is not likely.    Age Range Result Interpretation  NT-proBNP Concentration (pg/mL:      <50             Positive            >450                   Gray                 300-450                    Negative             <300    50-75           Positive            >900                  Gray                300-900                  Negative            <300      >75             Positive            >1800                  Gray                300-1800                  Negative            <300    Single High Sensitivity Troponin T [[id number]]  (Abnormal) Collected: 09/27/24 1349    Specimen: Blood Updated: 09/27/24 1416     HS Troponin T 23 ng/L     Narrative:      High Sensitive  Troponin T Reference Range:  <14.0 ng/L- Negative Female for AMI  <22.0 ng/L- Negative Male for AMI  >=14 - Abnormal Female indicating possible myocardial injury.  >=22 - Abnormal Male indicating possible myocardial injury.   Clinicians would have to utilize clinical acumen, EKG, Troponin, and serial changes to determine if it is an Acute Myocardial Infarction or myocardial injury due to an underlying chronic condition.         CBC Auto Differential [[id number]]  (Abnormal) Collected: 09/27/24 1349    Specimen: Blood Updated: 09/27/24 1456     WBC 33.61 10*3/mm3      RBC 4.71 10*6/mm3      Hemoglobin 15.2 g/dL      Hematocrit 47.8 %      .5 fL      MCH 32.3 pg      MCHC 31.8 g/dL      RDW 12.2 %      RDW-SD 45.9 fl      MPV 12.8 fL      Platelets 189 10*3/mm3     Scan Slide [[id number]] Collected: 09/27/24 1349    Specimen: Blood Updated: 09/27/24 1456     Scan Slide --     Comment: See Manual Differential Results       Acetone [[id number]]  (Abnormal) Collected: 09/27/24 1349    Specimen: Blood Updated: 09/27/24 1430     Acetone Large    Manual Differential [[id number]]  (Abnormal) Collected: 09/27/24 1349    Specimen: Blood Updated: 09/27/24 1456     Neutrophil % 89.0 %      Lymphocyte % 2.0 %      Monocyte % 6.0 %      Eosinophil % 1.0 %      Myelocyte % 2.0 %      Neutrophils Absolute 29.91 10*3/mm3      Lymphocytes Absolute 0.67 10*3/mm3      Monocytes Absolute 2.02 10*3/mm3      Eosinophils Absolute 0.34 10*3/mm3      Anisocytosis Slight/1+     Macrocytes Slight/1+     WBC Morphology Normal     Platelet Estimate Adequate     Large Platelets Slight/1+    Osmolality, Serum [[id number]]  (Abnormal) Collected: 09/27/24 1349    Specimen: Blood Updated: 09/27/24 1501     Osmolality 373 mOsm/kg     Phosphorus [[id number]]  (Abnormal) Collected: 09/27/24 1349    Specimen: Blood Updated: 09/27/24 1606     Phosphorus 11.2 mg/dL     Magnesium [[id number]]  (Abnormal) Collected: 09/27/24 1349    Specimen: Blood Updated:  09/27/24 1606     Magnesium 3.5 mg/dL     Hemoglobin A1c [[id number]]  (Abnormal) Collected: 09/27/24 1349    Specimen: Blood Updated: 09/27/24 2221     Hemoglobin A1C 13.90 %     Narrative:      Hemoglobin A1C Ranges:    Increased Risk for Diabetes  5.7% to 6.4%  Diabetes                     >= 6.5%  Diabetic Goal                < 7.0%    POC Chem Panel [[id number]]  (Abnormal) Collected: 09/27/24 1354    Specimen: Arterial Blood Updated: 09/27/24 1401     Glucose >682 mg/dL      Comment: Serial Number: [id number]:  [id number]        Sodium 132 mmol/L      POC Potassium 5.0 mmol/L      Ionized Calcium 1.16 mmol/L      Chloride 112 mmol/L      Creatinine 1.79 mg/dL      BUN 54 mg/dL      CO2 Content <5.0 mmol/L      Notified Who adriana     Read Back Yes    Blood Gas, Arterial - [[id number]]  (Abnormal) Collected: 09/27/24 1354    Specimen: Arterial Blood Updated: 09/27/24 1401     Site Right Radial     Flynn's Test Positive     pH, Arterial 6.801 pH units      pCO2, Arterial 30.3 mm Hg      pO2, Arterial 185.9 mm Hg      HCO3, Arterial 4.7 mmol/L      Base Excess, Arterial -29.8 mmol/L      Comment: Serial Number: [id number]:  [id number]        O2 Saturation, Arterial 97.9 %      Hemoglobin, Blood Gas 15.9 g/dL      Hematocrit, Blood Gas 47.0 %      Barometric Pressure for Blood Gas 723.3000 mmHg      Modality Bagging     FIO2 100 %      Notified Who adriana     Read Back Yes     Notified Time --     Hemodilution No     PO2/FIO2 186    POC Lactate [[id number]]  (Normal) Collected: 09/27/24 1354    Specimen: Arterial Blood Updated: 09/27/24 1401     Lactate 2.0 mmol/L      Comment: Serial Number: [id number]:  [id number]       Lactic Acid, Plasma [[id number]]  (Normal) Collected: 09/27/24 1521    Specimen: Blood Updated: 09/27/24 1546     Lactate 1.8 mmol/L     Blood Culture - Blood, Arm, Left [[id number]] Collected: 09/27/24 1521    Specimen: Blood from Arm, Left Updated: 09/27/24 1526    Blood Culture - Blood, Arm, Left  [[id number]] Collected: 09/27/24 1521    Specimen: Blood from Arm, Left Updated: 09/27/24 1526    CBC & Differential [[id number]]  (Abnormal) Collected: 09/27/24 1600    Specimen: Blood Updated: 09/27/24 1623    Narrative:      The following orders were created for panel order CBC & Differential.  Procedure                               Abnormality         Status                     ---------                               -----------         ------                     CBC Auto Differential[[id number]]        Abnormal            Final result               Scan Slide[[id number]]                                                                    Please view results for these tests on the individual orders.    Osmolality, Serum [[id number]]  (Abnormal) Collected: 09/27/24 1600    Specimen: Blood Updated: 09/27/24 1622     Osmolality 347 mOsm/kg     Basic Metabolic Panel [[id number]]  (Abnormal) Collected: 09/27/24 1600    Specimen: Blood Updated: 09/27/24 1642     Glucose 558 mg/dL      BUN 37 mg/dL      Creatinine 1.27 mg/dL      Sodium 143 mmol/L      Potassium 3.6 mmol/L      Chloride 117 mmol/L      CO2 5.4 mmol/L      Calcium 4.4 mg/dL      BUN/Creatinine Ratio 29.1     Anion Gap 20.6 mmol/L      eGFR 68.4 mL/min/1.73     Narrative:      GFR Normal >60  Chronic Kidney Disease <60  Kidney Failure <15      Magnesium [[id number]]  (Normal) Collected: 09/27/24 1600    Specimen: Blood Updated: 09/27/24 1643     Magnesium 1.7 mg/dL     Phosphorus [[id number]]  (Abnormal) Collected: 09/27/24 1600    Specimen: Blood Updated: 09/27/24 1643     Phosphorus 5.4 mg/dL     CBC Auto Differential [[id number]]  (Abnormal) Collected: 09/27/24 1600    Specimen: Blood Updated: 09/27/24 1623     WBC 29.30 10*3/mm3      RBC 3.72 10*6/mm3      Hemoglobin 12.3 g/dL      Hematocrit 37.4 %      .5 fL      MCH 33.1 pg      MCHC 32.9 g/dL      RDW 12.0 %      RDW-SD 45.1 fl      MPV 12.7 fL      Platelets 135 10*3/mm3      Neutrophil % 83.8 %   "    Lymphocyte % 6.5 %      Monocyte % 4.6 %      Eosinophil % 0.0 %      Basophil % 0.3 %      Immature Grans % 4.8 %      Neutrophils, Absolute 24.54 10*3/mm3      Lymphocytes, Absolute 1.90 10*3/mm3      Monocytes, Absolute 1.34 10*3/mm3      Eosinophils, Absolute 0.01 10*3/mm3      Basophils, Absolute 0.10 10*3/mm3      Immature Grans, Absolute 1.41 10*3/mm3      nRBC 0.0 /100 WBC     Procalcitonin [[id number]]  (Abnormal) Collected: 09/27/24 1600    Specimen: Blood Updated: 09/27/24 1849     Procalcitonin 0.74 ng/mL     Narrative:      As a Marker for Sepsis (Non-Neonates):    1. <0.5 ng/mL represents a low risk of severe sepsis and/or septic shock.  2. >2 ng/mL represents a high risk of severe sepsis and/or septic shock.    As a Marker for Lower Respiratory Tract Infections that require antibiotic therapy:    PCT on Admission    Antibiotic Therapy       6-12 Hrs later    >0.5                Strongly Recommended  >0.25 - <0.5        Recommended  0.1 - 0.25          Discouraged              Remeasure/reassess PCT  <0.1                Strongly Discouraged     Remeasure/reassess PCT    As 28 day mortality risk marker: \"Change in Procalcitonin Result\" (>80% or <=80%) if Day 0 (or Day 1) and Day 4 values are available. Refer to http://www.[employer]-pct-calculator.com    Change in PCT <=80%  A decrease of PCT levels below or equal to 80% defines a positive change in PCT test result representing a higher risk for 28-day all-cause mortality of patients diagnosed with severe sepsis for septic shock.    Change in PCT >80%  A decrease of PCT levels of more than 80% defines a negative change in PCT result representing a lower risk for 28-day all-cause mortality of patients diagnosed with severe sepsis or septic shock.    This test is Prognostic not Diagnostic, if elevated correlate with clinical findings before administering antibiotic treatment.        POC Glucose Once [[id number]]  (Abnormal) Collected: 09/27/24 1655    " Specimen: Blood Updated: 09/27/24 1657     Glucose >600 mg/dL      Comment: Serial Number: [id number]:  [id number]       POC Glucose Once [[id number]]  (Abnormal) Collected: 09/27/24 1804    Specimen: Blood Updated: 09/27/24 1806     Glucose >600 mg/dL      Comment: Serial Number: [id number]:  [id number]       Respiratory Panel PCR w/COVID-19(SARS-CoV-2) LETITIA/QUITA/ERIC/PAD/COR/JUAN CARLOS In-House, NP Swab in UTM/VTM, 2 HR TAT - Swab, Nasopharynx [[id number]]  (Normal) Collected: 09/27/24 1851    Specimen: Swab from Nasopharynx Updated: 09/27/24 1955     ADENOVIRUS, PCR Not Detected     Coronavirus 229E Not Detected     Coronavirus HKU1 Not Detected     Coronavirus NL63 Not Detected     Coronavirus OC43 Not Detected     COVID19 Not Detected     Human Metapneumovirus Not Detected     Human Rhinovirus/Enterovirus Not Detected     Influenza A PCR Not Detected     Influenza B PCR Not Detected     Parainfluenza Virus 1 Not Detected     Parainfluenza Virus 2 Not Detected     Parainfluenza Virus 3 Not Detected     Parainfluenza Virus 4 Not Detected     RSV, PCR Not Detected     Bordetella pertussis pcr Not Detected     Bordetella parapertussis PCR Not Detected     Chlamydophila pneumoniae PCR Not Detected     Mycoplasma pneumo by PCR Not Detected    Narrative:      In the setting of a positive respiratory panel with a viral infection PLUS a negative procalcitonin without other underlying concern for bacterial infection, consider observing off antibiotics or discontinuation of antibiotics and continue supportive care. If the respiratory panel is positive for atypical bacterial infection (Bordetella pertussis, Chlamydophila pneumoniae, or Mycoplasma pneumoniae), consider antibiotic de-escalation to target atypical bacterial infection.    MRSA Screen, PCR (Inpatient) - Swab, Nares [[id number]]  (Normal) Collected: 09/27/24 1851    Specimen: Swab from Nares Updated: 09/27/24 2017     MRSA PCR No MRSA Detected    Narrative:       The negative predictive value of this diagnostic test is high and should only be used to consider de-escalating anti-MRSA therapy. A positive result may indicate colonization with MRSA and must be correlated clinically.    POC Glucose Once [[id number]]  (Abnormal) Collected: 09/27/24 1916    Specimen: Blood Updated: 09/27/24 1920     Glucose >600 mg/dL      Comment: Serial Number: [id number]:  [id number]       Basic Metabolic Panel [[id number]]  (Abnormal) Collected: 09/27/24 1931    Specimen: Blood from Arm, Right Updated: 09/27/24 2004     Glucose 747 mg/dL      BUN 55 mg/dL      Creatinine 2.18 mg/dL      Sodium 138 mmol/L      Potassium 6.5 mmol/L      Chloride 104 mmol/L      CO2 7.0 mmol/L      Calcium 7.8 mg/dL      BUN/Creatinine Ratio 25.2     Anion Gap 27.0 mmol/L      eGFR 35.8 mL/min/1.73     Narrative:      GFR Normal >60  Chronic Kidney Disease <60  Kidney Failure <15      Magnesium [[id number]]  (Abnormal) Collected: 09/27/24 1931    Specimen: Blood from Arm, Right Updated: 09/27/24 2004     Magnesium 2.9 mg/dL     Phosphorus [[id number]]  (Abnormal) Collected: 09/27/24 1931    Specimen: Blood from Arm, Right Updated: 09/27/24 2004     Phosphorus 8.9 mg/dL     POC Chem Panel [[id number]]  (Abnormal) Collected: 09/27/24 1935    Specimen: Arterial Blood Updated: 09/27/24 1939     Glucose >682 mg/dL      Comment: Serial Number: [id number]:  [id number]        Sodium 136 mmol/L      POC Potassium 5.8 mmol/L      Ionized Calcium 1.14 mmol/L      Chloride 117 mmol/L      Creatinine 1.65 mg/dL      BUN 49 mg/dL      CO2 Content 8.4 mmol/L      Notified Who Staci RN     Read Back Yes    Blood Gas, Arterial - [[id number]]  (Abnormal) Collected: 09/27/24 1935    Specimen: Arterial Blood Updated: 09/27/24 1939     Site Left Radial     Flynn's Test Positive     pH, Arterial 6.870 pH units      pCO2, Arterial 46.7 mm Hg      pO2, Arterial 125.4 mm Hg      HCO3, Arterial 8.5 mmol/L      Base Excess, Arterial -25.2  mmol/L      Comment: Serial Number: [id number]:  [id number]        O2 Saturation, Arterial 94.5 %      Hemoglobin, Blood Gas 16.5 g/dL      Hematocrit, Blood Gas 48.0 %      Barometric Pressure for Blood Gas 725.2000 mmHg      Modality Adult Vent     FIO2 30 %      Ventilator Mode AC     Set Tidal Volume 400     PEEP 5     Notified Who Staci RN     Read Back Yes     Notified Time --     Hemodilution No     Respiratory Rate 24     PO2/FIO2 418    POC Lactate [[id number]]  (Normal) Collected: 09/27/24 1935    Specimen: Arterial Blood Updated: 09/27/24 1939     Lactate 1.1 mmol/L      Comment: Serial Number: [id number]:  [id number]       POC Glucose Once [[id number]]  (Abnormal) Collected: 09/27/24 2049    Specimen: Blood Updated: 09/27/24 2052     Glucose >600 mg/dL      Comment: Serial Number: [id number]:  [id number]       POC Glucose Once [[id number]]  (Abnormal) Collected: 09/27/24 2133    Specimen: Blood Updated: 09/27/24 2136     Glucose >600 mg/dL      Comment: Serial Number: [id number]:  [id number]       POC Glucose Once [[id number]]  (Abnormal) Collected: 09/27/24 2228    Specimen: Blood Updated: 09/27/24 2232     Glucose 553 mg/dL      Comment: Serial Number: [id number]:  [id number]       S. Pneumo Ag Urine or CSF - Urine, Urine, Clean Catch [[id number]] Collected: 09/27/24 2234    Specimen: Urine, Clean Catch Updated: 09/27/24 2245    Legionella Antigen, Urine - Urine, Urine, Clean Catch [[id number]] Collected: 09/27/24 2234    Specimen: Urine, Clean Catch Updated: 09/27/24 2245    Urinalysis With Culture If Indicated - Urine, Clean Catch [[id number]] Collected: 09/27/24 2234    Specimen: Urine, Clean Catch Updated: 09/27/24 2245             Imaging:    CT Head Without Contrast    Result Date: 9/27/2024  CT HEAD WO CONTRAST Date of Exam: 9/27/2024 4:28 PM EDT Indication: unresponsive. Comparison: None available. Technique: Axial CT images were obtained of the head without contrast administration.   Reconstructed coronal and sagittal images were also obtained. Automated exposure control and iterative construction methods were used. Findings: There is no evidence of acute intracranial hemorrhage, mass, midline shift, loss of gray-white matter differentiation, or extra-axial fluid collection. There is normal ventricular volume. The basal cisterns are patent. Normal density of the dural venous sinuses. No evidence of fracture. The paranasal sinuses are predominantly well aerated. Partial right mastoid air cell effusion. The orbits and included soft tissues show no acute abnormality.      Impression: No acute intracranial abnormality. Electronically Signed: Russ Moore MD  9/27/2024 4:44 PM EDT  Workstation ID: [id number]       Differential Diagnosis and Discussion:    Metabolic: Differential diagnosis includes but is not limited to hypertension, hyperglycemia, hyperkalemia, hypocalcemia, metabolic acidosis, hypokalemia, hypoglycemia, malnutrition, hypothyroidism, hyperthyroidism, and adrenal insufficiency.     All labs were reviewed and interpreted by me.  All X-rays impressions were independently interpreted by me.  EKG was interpreted by me.  CT scan radiology impression was interpreted by me.    MDM  Number of Diagnoses or Management Options  Diabetic ketoacidosis with coma associated with other specified diabetes mellitus  Hypothermia, initial encounter  Diagnosis management comments: In summary this is a 49-year-old male  who presents to the emergency department by EMS.  He arrives hypothermic, bradycardic, hypotensive.  He was intubated prior to arrival.  Core body temperature on arrival was found to be 81 °C.  CBC independently reviewed interpreted by me reveals critical abnormalities of leukocytosis.  CMP independently reviewed interpreted by me shows critical abnormalities of hyperglycemia, decreased serum bicarb, elevated anion gap all consistent with DKA.  Serum acetone largely positive based  on my independent review and interpretation.  Chest x-ray reviewed by me reveals tube in adequate position.  Patient was given warm fluids, insulin drip initiated and did require Levophed due to the hypotension.  Patient case has been discussed with the hospitalist team who will admit to the hospital for further evaluation and continuation of treatment.       Amount and/or Complexity of Data Reviewed  Clinical lab tests: reviewed             Patient presents with abnormal vital signs, most notably bradycardia, hypotension, hypothermia. The patient was given warm IV fluids, Levophed for fear of worsening rapid decline. Patient was placed on the cardiac monitor and was repeatedly assessed.  They were monitored for ventricular ectopy, arrhythmia, tachycardia, hypoxia, changes in blood pressure, and declining mental status several times throughout their stay in the emergency department.    Total Critical Care time of 60 minutes. Total critical care time documented does not include time spent on separately billed procedures for services of nurses or physician assistants. I personally saw and examined the patient. I have reviewed all diagnostic interpretations and treatment plans as written. I was present for the key portions of any procedures performed and the inclusive time noted in any critical care statement. Critical care time includes patient management by me, time spent at the patients bedside,  time to review lab and imaging results, discussing patient care, documentation in the medical record, and time spent with family or caregiver.      Sepsis criteria was met in the emergency department and the Sepsis protocol (including antibiotic administration) was initiated.      SIRS criteria considered:   1.  Temperature > 100.4 or <96.8    2.  Heart Rate > 90    3.  Respiratory Rate > 22    4.  WBC > 12K or <4K.             Severe Sepsis:     Respiratory: Mechanical Ventilation or Bipap  Hypotension: SBP > 90 or MAP <  65  Renal: Creatinine > 2  Metabolic: Lactic Acid > 2  Hematologic: Platelets < 100K or INR > 1.5  Hepatic: BILI  >  2  CNS: Sudden AMS     Septic Shock:     Severe Sepsis + Persistent hypotension or Lactic Acid > 4     Normal saline bolus, Antibiotics, and final disposition was based on these definitions.        Sepsis was recognized at 1458    Antibiotics were ordered.     30 cc/kg bolus was indicated.     The patient was ordered 2 L of fluids.    The patient presents with 2 out of the 4 SIRS criteria and a suspected source for sepsis.  Patient was evaluated and placed on a cardiac monitor for fear of worsening tachycardia and life-threatening hypotension.  Patient was monitored for shock and signs of end-organ damage.  Mental status was repeatedly checked throughout the ED stay.  Medications were ordered by me which includes IV cefepime, vancomycin.  The case was discussed at length with admitting physician.    Patient Care Considerations:    CT ABDOMEN AND PELVIS: I considered ordering a CT scan of the abdomen and pelvis however benign abdominal examination      Consultants/Shared Management Plan:    Hospitalist: I have discussed the case with Dr. Martinez who agrees to accept the patient for admission.  Consultant: I have discussed the case with Dr. Freeman who agrees to consult on the patient.    Social Determinants of Health:    Unable to communicate with patient in his current condition      Disposition and Care Coordination:    Admit:   Through independent evaluation of the patient's history, physical, and imperical data, the patient meets criteria for inpatient admission to the hospital.        Final diagnoses:   Hypothermia, initial encounter   Diabetic ketoacidosis with coma associated with other specified diabetes mellitus        ED Disposition       ED Disposition   Decision to Admit    Condition   --    Comment   Level of Care: Critical Care [6]   Diagnosis: DKA (diabetic ketoacidosis) [157793]    Certification: I Certify That Inpatient Hospital Services Are Medically Necessary For Greater Than 2 Midnights                 This medical record created using voice recognition software.             Bobby Cowart MD  09/27/24 [id number]

## 2024-09-28 NOTE — PROGRESS NOTES
RT EQUIPMENT DEVICE RELATED - SKIN ASSESSMENT    RT Medical Equipment/Device:     ETT Carroll/Anchorfast    Skin Assessment:      Mouth:  Intact    Device Skin Pressure Protection:  Pressure points protected    Nurse Notification:  Emely Torre

## 2024-09-28 NOTE — PROGRESS NOTES
"[facility] Clinical Pharmacy Services: Vancomycin Monitoring Note    Jaime Calix is a 49 y.o. male who is on day 2 of pharmacy to dose vancomycin for Bacteremia.    Previous Vancomycin Dose:  1250  mg IV every  24  hours  Imaging Reviewed?: Yes  Updated Cultures and Sensitivities:    Blood cx: in process   S. Pneumo, Legionella antigen: negative   Respiratory cx, PCR: none detected   MRSA PCR: not detected   Blood cx: Streptococcus spp, not A, B, or pneumoniae    Vitals/Labs  Ht: 182.9 cm (72\"); Wt: 94.7 kg (208 lb 12.4 oz)   Temp (24hrs), Av.2 °F (35.7 °C), Min:86 °F (30 °C), Max:99.5 °F (37.5 °C)   Estimated Creatinine Clearance: 54.6 mL/min (A) (by C-G formula based on SCr of 1.91 mg/dL (H)).     Results from last 7 days   Lab Units 24  1449 24  1208 24  0804 24  0409 24  1931 24  1600 24  1354 24  1349   VANCOMYCIN RM mcg/mL 18.56  --   --   --   --   --   --   --    CREATININE mg/dL  --  1.91* 2.01* 2.13*   < > 1.27   < > 2.44*   WBC 10*3/mm3  --   --   --  12.93*  --  29.30*  --  33.61*    < > = values in this interval not displayed.     Assessment/Plan    Current Vancomycin Dose:  1500 mg IV every 24 hours; which provides the following predicted parameters:  AUC24,ss: 567 mg/L.hr  Probability of AUC24 > 400: 95 %  Ctrough,ss: 17.6 mg/L  Probability of Ctrough,ss > 20: 35 %  Probability of nephrotoxicity (Lodise BHARATI ): 13 %  **Adjusted start time of order to @1600 due to level being less than 20 mcg/ml**  Next Vanc Random ordered for  at AM labs (due to changes in renal function)  We will continue to monitor patient changes and renal function     Thank you for involving pharmacy in this patient's care. Please contact pharmacy with any questions or concerns.    Daniela Roldan [facility]  Clinical Pharmacist    "

## 2024-09-28 NOTE — PROGRESS NOTES
[facility]     Progress Note    Patient Name: Jaime Calix  : 1975  MRN: [id number]  Primary Care Physician:  Provider, No Known  Date of admission: 2024    Subjective   Subjective     Patient is critically ill in the ICU with septic shock, strep bacteremia, DKA    Critically ill  Intubated  On ventilator  On insulin drip  On levo    Objective   Objective     Vitals:   Temp:  [85.1 °F (29.5 °C)-99.5 °F (37.5 °C)] 98.8 °F (37.1 °C)  Heart Rate:  [] 102  Resp:  [18-20] 20  BP: ()/() 128/72  FiO2 (%):  [30 %] 30 %    Physical Exam   Ill-appearing male  Follows commands  Does appear agitated  Result Review    Result Review:  I have personally reviewed the results from the time of this admission to 2024 10:17 EDT and agree with these findings:  [x]  Laboratory list / accordion  [x]  Microbiology  [x]  Radiology  [x]  EKG/Telemetry   []  Cardiology/Vascular   []  Pathology  []  Old records  []  Other:  Most notable findings include:   Blood cultures gram-positive cocci in chains      Assessment & Plan   Assessment / Plan     Brief Patient Summary:  Jaime Calix is a 49 y.o. male who presented to the hospital with hypothermia, septic shock noted to have gram-positive cocci in chains in blood, was on mechanical ventilator with DKA requiring vasopressors.    Active Hospital Problems:  Active Hospital Problems    Diagnosis     **DKA (diabetic ketoacidosis)     Metabolic acidosis     Hypothermia     Hypoxic respiratory failure     Leukocytosis     Hyperphosphatemia     CHARI (acute kidney injury)     Metabolic encephalopathy    Gram-positive bacteremia  Newly diagnosed diabetes  Diabetic ketoacidosis with coma  Septic shock, present on admission  Hypothermia  Hypophosphatemia  Hypokalemia    Plan:   Expected nasal cannula  Continue insulin drip  Start D5 LR  Discontinue fluid exchanges  Discontinue bicarb drip  Continue with serial BMPs  Trend lactic acid until clear  Continue  broad-spectrum antibiotics vancomycin and cefepime  Blood cultures with gram-positive cocci in chains will repeat blood cultures today  Obtain echocardiogram  Overall vasopressor demand decreasing wean Levophed for MAP greater than 65  Trend renal function  Replace electrolytes IV potassium with K-Phos  Reduce hydrocortisone to 50 every 8 and wean daily  And urine culture to urinalysis    -Diabetes education consulted    Peptic ulcer prophylaxis Pepcid  VTE Prophylaxis: Subcutaneous heparin  Pharmacologic & mechanical VTE prophylaxis orders are present.        CODE STATUS:    Code Status (Patient has no pulse and is not breathing): CPR (Attempt to Resuscitate)  Medical Interventions (Patient has pulse or is breathing): Full Support    I, RADHA Harry, spent 15 minutes critical care time.    Electronically signed by RADHA Brown, 09/28/24, 2:09 PM EDT.  Total critical care time spent by our service 45 minutes  Patient is critically ill in ICU with DKA, respiratory failure, profound acidosis and acute kidney injury  I spent 30 minutes of critical care time excluding any procedure notes, spent in review, analysis, obtaining history and physical, formulating care plan, and I led multi-disciplinary critical care rounds with bedside nurse, respiratory therapist, clinical pharmacist and other allied services. I have discussed the case with primary service and other consultants as well.    Electronically signed by Kalyan Freeman DO, 09/28/24, 6:03 PM EDT.

## 2024-09-28 NOTE — PLAN OF CARE
Goal Outcome Evaluation:   Patient was on AC/VC at the beginning of my shift but patient has become more dyschronis with the vent and breath stacking along with tachypnea and restlessness.  We placed patient on SBT tonight on 8/5 30% and patient is tolerating very well.  We will cont. As tolerated.

## 2024-09-28 NOTE — PROGRESS NOTES
RT EQUIPMENT DEVICE RELATED - SKIN ASSESSMENT    RT Medical Equipment/Device:     ETT Carroll/Anchorfast    Skin Assessment:      Cheek:  Intact  Lips:  Intact  Mouth:  Intact    Device Skin Pressure Protection:  Positioning supports utilized    Nurse Notification:  Emely Hoyos, RRT

## 2024-09-28 NOTE — PLAN OF CARE
Goal Outcome Evaluation:  Plan of Care Reviewed With: patient           Outcome Evaluation: exrubated this a.m., dong remains in place. insulin drip continued per MAR, NPO, 3.5 NC, precedex initiated per MAR. wife at bedside, no complaints at this time.

## 2024-09-28 NOTE — PLAN OF CARE
Goal Outcome Evaluation:            Patient extubated at 8:35 am. Placed on nasal cannula.

## 2024-09-28 NOTE — PROGRESS NOTES
[facility]   Hospitalist Progress Note  Date: 2024  Patient Name: Jaime Calix  : 1975  MRN: [id number]  Date of admission: 2024      Subjective   Subjective     Chief Complaint: Patient found unresponsive at the back of his truck    Summary:   Patient is a 49 y.o. year old male who presents to the emergency department for evaluation of unresponsive.  Patient has been missing for approximately 24 hours and wife and [employer] supervisor were unable to locate him.  Bystander from a truck stop called 911 to assist with the patient that appeared to be unresponsive in the back of his truck.  Uncertain as to how long he has been back there.  Upon EMS arrival patient was unresponsive and cold to the touch.  They do report a low axillary temperature.  Due to patient being unresponsive he was intubated in the field.  He was also found to be hypotensive and hypothermic.  Workup showed DKA.  Patient has no prior history of diabetes although it runs in the family.  Per patients wife patient apparently has been having nausea and vomiting for past several weeks.    Interval Followup:   Patient extubated to 2 L.  Patient is bit lethargic but follows vocal commands appropriately.  Patient wants restraints to be DC'd.  Per nursing staff he has been pulling on his IVs.  Wants to urinate although has Weaver catheter in.  Denies any chest pain, abdominal pain.  Patient is off vasopressin and Levophed.  Review of Systems   All systems were reviewed and negative except for: Limited as patient bit lethargic but as per summary and interval follow-up    Objective   Objective     Vitals:   Temp:  [88.9 °F (31.6 °C)-99.5 °F (37.5 °C)] 98.6 °F (37 °C)  Heart Rate:  [] 69  Resp:  [15-20] 15  BP: ()/(28-93) 89/58  Flow (L/min):  [3.5] 3.5  FiO2 (%):  [30 %] 30 %  Physical Exam    Constitutional: Lethargic, no acute distress   Eyes: Pupils equal, sclerae anicteric, no conjunctival injection   HENT: NCAT,  mucous membranes moist   Neck: Supple, no thyromegaly, no lymphadenopathy, trachea midline   Respiratory: Decreased to auscultation bilaterally, nonlabored respirations    Cardiovascular: Tachycardic, no murmurs, rubs, or gallops, palpable pedal pulses bilaterally   Gastrointestinal: Positive bowel sounds, soft, nontender, nondistended   Musculoskeletal: No bilateral ankle edema, no clubbing or cyanosis to extremities   Psychiatric: Appropriate affect, cooperative   Neurologic: Oriented x1, strength symmetric in all extremities, Cranial Nerves grossly intact to confrontation, speech clear   Skin: No rashes   Genitourinary.  Has Weaver catheter    Result Review    Result Review:  I have personally reviewed the results for the past 24 hours and agree with these findings:  [x]  Laboratory  [x]  Microbiology  [x]  Radiology  [x]  EKG/Telemetry sinus rhythm 54, QT 0.5  []  Cardiology/Vascular   []  Pathology  [x]  Old records  [x]  Other: Medications    Assessment & Plan   Assessment / Plan     Assessment:  Acute metabolic encephalopathy/unresponsive.  Improving.  DKA.  Gap closed  Hypothermic.  Hypoxemia.  Intubated in field.  Extubated September 28  Acute kidney injury.  Thrombocytopenia.  New diagnosis of diabetes mellitus hemoglobin A1c of 13.9%.  Strep bacteremia.  Septic shock present admission as patient has hypotension, leukocytosis and lactic acidosis with hypothermia  Hypophosphatemia.  Hypokalemia.  Hyper natremia.  Hypomagnesemia.  Metabolic acidosis due to lactic acidosis and DKA.  History of obstructive sleep apnea.    Plan:  Continue supplemental oxygen keep sats more than 90%  Wean off pressors to keep MAP more than 65  Continue Precedex.  As needed IV Ativan for agitation and restlessness  DC insulin therapy.  Gap closed  Sliding-scale insulin  IV fluid as per pulmonary.  DC cefepime.  Continue vancomycin.  Await culture data.  Respiratory panel PCR and MRSA PCR negative.  Replace electrolytes.  Trend  creatinine.  Check CPK.  Wean off Solu-Cortef IV.  Speech therapy to evaluate patient.  Advance diet as tolerated.  Continue Weaver catheter.  Continue ICU care  Diabetic nurse educator and dietitian to see patient when stable  Discussed plan with RN.    VTE Prophylaxis:  Pharmacologic & mechanical VTE prophylaxis orders are present.        CODE STATUS:   Code Status (Patient has no pulse and is not breathing): CPR (Attempt to Resuscitate)  Medical Interventions (Patient has pulse or is breathing): Full Support      Part of this note may be an electronic transcription/translation of spoken language to printed text using the Dragon Dictation System.   Critical care time spent 31 minutes  Electronically signed by Randolph Ramirez MD, 09/28/24, 5:33 PM EDT.

## 2024-09-28 NOTE — PLAN OF CARE
Goal Outcome Evaluation:  Plan of Care Reviewed With: patient        Progress: improving       Pt opens eyes to voice, sometimes spontaneous eye opening. Vasopressin off. Weaning down levophed currently at 0.06 and should be able to have on standby by end of shift. Sodium bicarb drip at 200ml/hr. Insulin drip per glucomander.IVF per DKA protocol at 250ml/hr. Urine output good. Cm SR. Pt on pressure support 8/5 Fio2 30% on vent. Pt is calm and cooperative with no sedation.

## 2024-09-28 NOTE — PROGRESS NOTES
"[facility] Clinical Pharmacy Services: Vancomycin Pharmacokinetic Initial Consult Note    Jaime Franco is a 49 y.o. male who is on day 1 of pharmacy to dose vancomycin for Bacteremia.    Consult Information  Consulting Provider: Myah   Planned Duration of Therapy: 7 days  Was Patient Receiving Prior to Admission/Consult?: No  Loading Dose Ordered or Given: 2000 mg on 24 at 1624  PK/PD Target: -600 mg/L.hr   Relevant ID History: no  Other Antimicrobials: Cefepime    Imaging Reviewed?: Yes    Microbiology Data  MRSA PCR performed: No; Result: Not ordered due to excluded indication or presence of suspected abscess  Culture/Source:       Vitals/Labs  Ht: 182.9 cm (72\"); Wt: 94.7 kg (208 lb 12.4 oz)  Temp (24hrs), Av.3 °F (33.5 °C), Min:85.1 °F (29.5 °C), Max:97.7 °F (36.5 °C)   Estimated Creatinine Clearance: 63.2 mL/min (A) (by C-G formula based on SCr of 1.65 mg/dL (H)).       Results from last 7 days   Lab Units 24  1935 24  1931 24  1600 24  1354 24  1349   CREATININE mg/dL 1.65* 2.18* 1.27   < > 2.44*   WBC 10*3/mm3  --   --  29.30*  --  33.61*    < > = values in this interval not displayed.     Assessment/Plan:    Vancomycin Dose:  1250 mg IV every 24 hours; which provides the following predicted parameters:  Exposure target: AUC24 (range)400-600 mg/L.hr   AUC24,ss: 524 mg/L.hr  Probability of AUC24 > 400: 78 %  Ctrough,ss: 16.6 mg/L  Probability of Ctrough,ss > 20: 34 %  Probability of nephrotoxicity (Lodise BHARATI ): 12 %  Vanc Random ordered for 24 at 1400  Patient has order for Basic Metabolic Panel    Pharmacy will follow patient's kidney function and will adjust doses and obtain levels as necessary. Thank you for involving pharmacy in this patient's care. Please contact pharmacy with any questions or concerns.                           Espinoza Grider [facility]  Clinical Pharmacist   "

## 2024-09-29 LAB
ANION GAP SERPL CALCULATED.3IONS-SCNC: 11.5 MMOL/L (ref 5–15)
ANION GAP SERPL CALCULATED.3IONS-SCNC: 19 MMOL/L (ref 5–15)
ANION GAP SERPL CALCULATED.3IONS-SCNC: 20.6 MMOL/L (ref 5–15)
BUN SERPL-MCNC: 46 MG/DL (ref 6–20)
BUN SERPL-MCNC: 50 MG/DL (ref 6–20)
BUN SERPL-MCNC: 52 MG/DL (ref 6–20)
BUN/CREAT SERPL: 24.5 (ref 7–25)
BUN/CREAT SERPL: 24.9 (ref 7–25)
BUN/CREAT SERPL: 25.4 (ref 7–25)
CALCIUM SPEC-SCNC: 8.3 MG/DL (ref 8.6–10.5)
CALCIUM SPEC-SCNC: 8.4 MG/DL (ref 8.6–10.5)
CALCIUM SPEC-SCNC: 8.7 MG/DL (ref 8.6–10.5)
CHLORIDE SERPL-SCNC: 112 MMOL/L (ref 98–107)
CHLORIDE SERPL-SCNC: 114 MMOL/L (ref 98–107)
CHLORIDE SERPL-SCNC: 121 MMOL/L (ref 98–107)
CO2 SERPL-SCNC: 16.4 MMOL/L (ref 22–29)
CO2 SERPL-SCNC: 18 MMOL/L (ref 22–29)
CO2 SERPL-SCNC: 23.5 MMOL/L (ref 22–29)
CREAT SERPL-MCNC: 1.88 MG/DL (ref 0.76–1.27)
CREAT SERPL-MCNC: 1.97 MG/DL (ref 0.76–1.27)
CREAT SERPL-MCNC: 2.09 MG/DL (ref 0.76–1.27)
DEPRECATED RDW RBC AUTO: 39.6 FL (ref 37–54)
EGFRCR SERPLBLD CKD-EPI 2021: 37.6 ML/MIN/1.73
EGFRCR SERPLBLD CKD-EPI 2021: 40.4 ML/MIN/1.73
EGFRCR SERPLBLD CKD-EPI 2021: 42.7 ML/MIN/1.73
ERYTHROCYTE [DISTWIDTH] IN BLOOD BY AUTOMATED COUNT: 12.2 % (ref 12.3–15.4)
GLUCOSE BLDC GLUCOMTR-MCNC: 129 MG/DL (ref 70–99)
GLUCOSE BLDC GLUCOMTR-MCNC: 137 MG/DL (ref 70–99)
GLUCOSE BLDC GLUCOMTR-MCNC: 146 MG/DL (ref 70–99)
GLUCOSE BLDC GLUCOMTR-MCNC: 157 MG/DL (ref 70–99)
GLUCOSE BLDC GLUCOMTR-MCNC: 161 MG/DL (ref 70–99)
GLUCOSE BLDC GLUCOMTR-MCNC: 183 MG/DL (ref 70–99)
GLUCOSE BLDC GLUCOMTR-MCNC: 231 MG/DL (ref 70–99)
GLUCOSE BLDC GLUCOMTR-MCNC: 265 MG/DL (ref 70–99)
GLUCOSE BLDC GLUCOMTR-MCNC: 290 MG/DL (ref 70–99)
GLUCOSE BLDC GLUCOMTR-MCNC: 302 MG/DL (ref 70–99)
GLUCOSE BLDC GLUCOMTR-MCNC: 366 MG/DL (ref 70–99)
GLUCOSE BLDC GLUCOMTR-MCNC: 372 MG/DL (ref 70–99)
GLUCOSE BLDC GLUCOMTR-MCNC: 391 MG/DL (ref 70–99)
GLUCOSE BLDC GLUCOMTR-MCNC: 399 MG/DL (ref 70–99)
GLUCOSE SERPL-MCNC: 169 MG/DL (ref 65–99)
GLUCOSE SERPL-MCNC: 395 MG/DL (ref 65–99)
GLUCOSE SERPL-MCNC: 422 MG/DL (ref 65–99)
HCT VFR BLD AUTO: 33.8 % (ref 37.5–51)
HGB BLD-MCNC: 12.3 G/DL (ref 13–17.7)
HOLD SPECIMEN: NORMAL
LIPASE SERPL-CCNC: 132 U/L (ref 13–60)
MAGNESIUM SERPL-MCNC: 2.7 MG/DL (ref 1.6–2.6)
MCH RBC QN AUTO: 32.5 PG (ref 26.6–33)
MCHC RBC AUTO-ENTMCNC: 36.4 G/DL (ref 31.5–35.7)
MCV RBC AUTO: 89.4 FL (ref 79–97)
PHOSPHATE SERPL-MCNC: 2.1 MG/DL (ref 2.5–4.5)
PLATELET # BLD AUTO: 87 10*3/MM3 (ref 140–450)
PMV BLD AUTO: 13.5 FL (ref 6–12)
POTASSIUM SERPL-SCNC: 3.7 MMOL/L (ref 3.5–5.2)
POTASSIUM SERPL-SCNC: 4 MMOL/L (ref 3.5–5.2)
POTASSIUM SERPL-SCNC: 4.2 MMOL/L (ref 3.5–5.2)
RBC # BLD AUTO: 3.78 10*6/MM3 (ref 4.14–5.8)
SODIUM SERPL-SCNC: 149 MMOL/L (ref 136–145)
SODIUM SERPL-SCNC: 151 MMOL/L (ref 136–145)
SODIUM SERPL-SCNC: 156 MMOL/L (ref 136–145)
VANCOMYCIN SERPL-MCNC: 26.45 MCG/ML (ref 5–40)
WBC NRBC COR # BLD AUTO: 10.06 10*3/MM3 (ref 3.4–10.8)

## 2024-09-29 PROCEDURE — 82948 REAGENT STRIP/BLOOD GLUCOSE: CPT

## 2024-09-29 PROCEDURE — 25010000002 AMPICILLIN PER 500 MG: Performed by: NURSE PRACTITIONER

## 2024-09-29 PROCEDURE — 94799 UNLISTED PULMONARY SVC/PX: CPT

## 2024-09-29 PROCEDURE — 99233 SBSQ HOSP IP/OBS HIGH 50: CPT | Performed by: INTERNAL MEDICINE

## 2024-09-29 PROCEDURE — 84100 ASSAY OF PHOSPHORUS: CPT | Performed by: NURSE PRACTITIONER

## 2024-09-29 PROCEDURE — 80202 ASSAY OF VANCOMYCIN: CPT | Performed by: INTERNAL MEDICINE

## 2024-09-29 PROCEDURE — 25810000003 DEXTROSE 5% IN LACTATED RINGERS PER 1000 ML: Performed by: NURSE PRACTITIONER

## 2024-09-29 PROCEDURE — 25010000002 HYDROCORTISONE SOD SUC (PF) 100 MG RECONSTITUTED SOLUTION: Performed by: INTERNAL MEDICINE

## 2024-09-29 PROCEDURE — 0 DEXTROSE 5 % SOLUTION: Performed by: NURSE PRACTITIONER

## 2024-09-29 PROCEDURE — 85027 COMPLETE CBC AUTOMATED: CPT | Performed by: PHYSICIAN ASSISTANT

## 2024-09-29 PROCEDURE — 63710000001 INSULIN REGULAR HUMAN PER 5 UNITS: Performed by: INTERNAL MEDICINE

## 2024-09-29 PROCEDURE — 83735 ASSAY OF MAGNESIUM: CPT | Performed by: NURSE PRACTITIONER

## 2024-09-29 PROCEDURE — 99291 CRITICAL CARE FIRST HOUR: CPT | Performed by: INTERNAL MEDICINE

## 2024-09-29 PROCEDURE — 80048 BASIC METABOLIC PNL TOTAL CA: CPT | Performed by: NURSE PRACTITIONER

## 2024-09-29 PROCEDURE — 63710000001 INSULIN GLARGINE PER 5 UNITS: Performed by: NURSE PRACTITIONER

## 2024-09-29 PROCEDURE — 25010000002 HEPARIN (PORCINE) PER 1000 UNITS: Performed by: INTERNAL MEDICINE

## 2024-09-29 PROCEDURE — 83690 ASSAY OF LIPASE: CPT | Performed by: NURSE PRACTITIONER

## 2024-09-29 PROCEDURE — 92610 EVALUATE SWALLOWING FUNCTION: CPT

## 2024-09-29 RX ORDER — SODIUM CHLORIDE 0.9 % (FLUSH) 0.9 %
10 SYRINGE (ML) INJECTION EVERY 12 HOURS SCHEDULED
Status: DISCONTINUED | OUTPATIENT
Start: 2024-09-29 | End: 2024-10-01

## 2024-09-29 RX ORDER — DEXTROSE MONOHYDRATE 50 MG/ML
125 INJECTION, SOLUTION INTRAVENOUS CONTINUOUS
Status: DISCONTINUED | OUTPATIENT
Start: 2024-09-29 | End: 2024-10-01

## 2024-09-29 RX ORDER — SODIUM CHLORIDE 9 MG/ML
40 INJECTION, SOLUTION INTRAVENOUS AS NEEDED
Status: DISCONTINUED | OUTPATIENT
Start: 2024-09-29 | End: 2024-10-01

## 2024-09-29 RX ORDER — DEXTROSE MONOHYDRATE 25 G/50ML
10-50 INJECTION, SOLUTION INTRAVENOUS
Status: DISCONTINUED | OUTPATIENT
Start: 2024-09-29 | End: 2024-10-01

## 2024-09-29 RX ORDER — SODIUM CHLORIDE 0.9 % (FLUSH) 0.9 %
10 SYRINGE (ML) INJECTION AS NEEDED
Status: DISCONTINUED | OUTPATIENT
Start: 2024-09-29 | End: 2024-10-01

## 2024-09-29 RX ORDER — NICOTINE POLACRILEX 4 MG
15 LOZENGE BUCCAL
Status: DISCONTINUED | OUTPATIENT
Start: 2024-09-29 | End: 2024-10-01

## 2024-09-29 RX ADMIN — SODIUM CHLORIDE, SODIUM LACTATE, POTASSIUM CHLORIDE, CALCIUM CHLORIDE AND DEXTROSE MONOHYDRATE 100 ML/HR: 5; 600; 310; 30; 20 INJECTION, SOLUTION INTRAVENOUS at 04:53

## 2024-09-29 RX ADMIN — Medication 10 ML: at 21:48

## 2024-09-29 RX ADMIN — INSULIN GLARGINE 15 UNITS: 100 INJECTION, SOLUTION SUBCUTANEOUS at 09:51

## 2024-09-29 RX ADMIN — SENNOSIDES AND DOCUSATE SODIUM 2 TABLET: 50; 8.6 TABLET ORAL at 09:51

## 2024-09-29 RX ADMIN — AMPICILLIN SODIUM 2 G: 2 INJECTION, POWDER, FOR SOLUTION INTRAVENOUS at 17:48

## 2024-09-29 RX ADMIN — AMPICILLIN SODIUM 2 G: 2 INJECTION, POWDER, FOR SOLUTION INTRAVENOUS at 12:29

## 2024-09-29 RX ADMIN — HEPARIN SODIUM 5000 UNITS: 5000 INJECTION INTRAVENOUS; SUBCUTANEOUS at 21:48

## 2024-09-29 RX ADMIN — ACETAMINOPHEN 650 MG: 325 TABLET ORAL at 09:51

## 2024-09-29 RX ADMIN — FAMOTIDINE 20 MG: 10 INJECTION INTRAVENOUS at 09:51

## 2024-09-29 RX ADMIN — INSULIN HUMAN 3.2 UNITS/HR: 1 INJECTION, SOLUTION INTRAVENOUS at 22:52

## 2024-09-29 RX ADMIN — INSULIN HUMAN 6.6 UNITS/HR: 1 INJECTION, SOLUTION INTRAVENOUS at 11:14

## 2024-09-29 RX ADMIN — FAMOTIDINE 20 MG: 10 INJECTION INTRAVENOUS at 21:48

## 2024-09-29 RX ADMIN — AMPICILLIN SODIUM 2 G: 2 INJECTION, POWDER, FOR SOLUTION INTRAVENOUS at 21:47

## 2024-09-29 RX ADMIN — Medication 10 ML: at 09:52

## 2024-09-29 RX ADMIN — CHLORHEXIDINE GLUCONATE 15 ML: 1.2 RINSE ORAL at 21:48

## 2024-09-29 RX ADMIN — DEXTROSE MONOHYDRATE 75 ML/HR: 50 INJECTION, SOLUTION INTRAVENOUS at 22:56

## 2024-09-29 RX ADMIN — DEXTROSE MONOHYDRATE 75 ML/HR: 50 INJECTION, SOLUTION INTRAVENOUS at 11:26

## 2024-09-29 RX ADMIN — HEPARIN SODIUM 5000 UNITS: 5000 INJECTION INTRAVENOUS; SUBCUTANEOUS at 09:51

## 2024-09-29 RX ADMIN — INSULIN HUMAN 6 UNITS: 100 INJECTION, SOLUTION PARENTERAL at 05:19

## 2024-09-29 RX ADMIN — HYDROCORTISONE SODIUM SUCCINATE 25 MG: 100 INJECTION, POWDER, FOR SOLUTION INTRAMUSCULAR; INTRAVENOUS at 07:52

## 2024-09-29 RX ADMIN — SENNOSIDES AND DOCUSATE SODIUM 2 TABLET: 50; 8.6 TABLET ORAL at 21:48

## 2024-09-29 NOTE — PROGRESS NOTES
[facility]   Hospitalist Progress Note  Date: 2024  Patient Name: Jaime Calix  : 1975  MRN: [id number]  Date of admission: 2024      Subjective   Subjective     Chief Complaint: Patient found unresponsive at the back of his truck    Summary:   Patient is a 49 y.o. year old male who presents to the emergency department for evaluation of unresponsive.  Patient has been missing for approximately 24 hours and wife and [employer] supervisor were unable to locate him.  Bystander from a truck stop called 911 to assist with the patient that appeared to be unresponsive in the back of his truck.  Uncertain as to how long he has been back there.  Upon EMS arrival patient was unresponsive and cold to the touch.  They do report a low axillary temperature.  Due to patient being unresponsive he was intubated in the field.  He was also found to be hypotensive and hypothermic.  Workup showed DKA.  Patient has no prior history of diabetes although it runs in the family.  Per patients wife patient apparently has been having nausea and vomiting for past several weeks.  Patient extubated on  to 2 L    Interval Followup:   Oxygen up to 3.5 l  Patient is bit lethargic but follows vocal commands appropriately.  Remains somewhat confused  Out of restraints  Remains NPO by speech therapy.  Insulin drip restarted as anion gap recurred  Denies any chest pain, abdominal pain.  Good urine output  Review of Systems   All systems were reviewed and negative except for: Limited as patient bit lethargic but as per summary and interval follow-up    Objective   Objective     Vitals:   Temp:  [97.7 °F (36.5 °C)-99.1 °F (37.3 °C)] 98.4 °F (36.9 °C)  Heart Rate:  [58-91] 81  Resp:  [13-23] 13  BP: ()/() 132/70  Flow (L/min):  [3.5] 3.5  Physical Exam    Constitutional: Lethargic, no acute distress   Eyes: Pupils equal, sclerae anicteric, no conjunctival injection   HENT: NCAT, mucous membranes  moist   Neck: Supple, no thyromegaly, no lymphadenopathy, trachea midline   Respiratory: Decreased to auscultation bilaterally, nonlabored respirations    Cardiovascular: Tachycardic, no murmurs, rubs, or gallops, palpable pedal pulses bilaterally   Gastrointestinal: Positive bowel sounds, soft, nontender, nondistended   Musculoskeletal: No bilateral ankle edema, no clubbing or cyanosis to extremities   Psychiatric: Appropriate affect, cooperative   Neurologic: Oriented x1, strength symmetric in all extremities, Cranial Nerves grossly intact to confrontation, speech clear   Skin: No rashes   Genitourinary.  Has Weaver catheter    Result Review    Result Review:  I have personally reviewed the results for the past 24 hours and agree with these findings:  [x]  Laboratory  [x]  Microbiology  [x]  Radiology  [x]  EKG/Telemetry sinus rhythm 54, QT 0.5  []  Cardiology/Vascular   []  Pathology  [x]  Old records  [x]  Other: Medications    Assessment & Plan   Assessment / Plan     Assessment:  Acute metabolic encephalopathy/unresponsive.  Improving.  DKA.  Gap recurrent  Hypothermic.  Resolved  Hypoxemia.  Intubated in field.  Extubated September 28.  Improving  Acute kidney injury.  Baseline not known  Thrombocytopenia.  Worsening  New diagnosis of diabetes mellitus hemoglobin A1c of 13.9%.  Strep bacteremia.  Streptococcus alphahemolytic  Septic shock present admission as patient has hypotension, leukocytosis and lactic acidosis with hypothermia.  Resolved  Rhabdo.  Hypophosphatemia.  Hypokalemia.  Hyper natremia.  Free water deficit  Hypomagnesemia.  Metabolic acidosis due to lactic acidosis and DKA.  History of obstructive sleep apnea.    Plan:  Continue supplemental oxygen keep sats more than 90%  Off pressors  As needed IV Ativan for agitation and restlessness  D5 water for hypernatremia  Restarted on IV insulin drip monitor anion gap closely.  as per intensivist DC'd sliding-scale insulin  IV fluid as per  pulmonary.  On IV ampicillin.  Off Vanco  Await culture data.  Respiratory panel PCR and MRSA PCR negative.  Replace electrolytes.  Trend creatinine.  Noted elevated CPK.   Solu-Cortef IV.  DC'd  Speech therapy to evaluate patient.  Cleared for clear liquid diet  Core track and tube feeding as per dietitian  Continue Weaver catheter.  Continue ICU care  Diabetic nurse educator and dietitian to see patient when stable  Discussed plan with RN.    VTE Prophylaxis:  Pharmacologic & mechanical VTE prophylaxis orders are present.        CODE STATUS:   Code Status (Patient has no pulse and is not breathing): CPR (Attempt to Resuscitate)  Medical Interventions (Patient has pulse or is breathing): Full Support      Part of this note may be an electronic transcription/translation of spoken language to printed text using the Dragon Dictation System.     Electronically signed by Randolph Ramirez MD, 09/29/24, 5:41 PM EDT.

## 2024-09-29 NOTE — PROGRESS NOTES
"[facility] Clinical Pharmacy Services: Vancomycin Monitoring Note    Jaime Calix is a 49 y.o. male who is on day 3 of pharmacy to dose vancomycin for Bacteremia.    Previous Vancomycin Dose:  1500 mg IV every  24  hours  Imaging Reviewed?: Yes  Updated Cultures and Sensitivities:    Blood cx: in process   S. Pneumo, Legionella antigen: negative   Respiratory cx, PCR: none detected   MRSA PCR: not detected   Blood cx: Streptococcus spp, not A, B, or pneumoniae    Vitals/Labs  Ht: 182.9 cm (72\"); Wt: 94.7 kg (208 lb 12.4 oz)   Temp (24hrs), Av.2 °F (35.7 °C), Min:86 °F (30 °C), Max:99.5 °F (37.5 °C)   Estimated Creatinine Clearance: 54.6 mL/min (A) (by C-G formula based on SCr of 1.91 mg/dL (H)).     Results from last 7 days   Lab Units 24  1449 24  1208 24  0804 24  0409 24  1931 24  1600 24  1354 24  1349   VANCOMYCIN RM mcg/mL 18.56  --   --   --   --   --   --   --    CREATININE mg/dL  --  1.91* 2.01* 2.13*   < > 1.27   < > 2.44*   WBC 10*3/mm3  --   --   --  12.93*  --  29.30*  --  33.61*    < > = values in this interval not displayed.     Assessment/Plan    Current Vancomycin Dose:  1250 mg IV every 24 hours; which provides the following predicted parameters:  AUC24,ss: 514 mg/L.hr  Probability of AUC24 > 400: 87 %  Ctrough,ss: 16 mg/L  Probability of Ctrough,ss > 20: 25 %  Probability of nephrotoxicity (Lodise BHARATI ): 11 %  Next Vanc Random ordered for 10/1 with AM labs.   We will continue to monitor patient changes and renal function     Thank you for involving pharmacy in this patient's care. Please contact pharmacy with any questions or concerns.    Daniela Roldan [facility]  Clinical Pharmacist  "

## 2024-09-29 NOTE — PLAN OF CARE
Goal Outcome Evaluation:                      FUNCTIONAL ASSESSMENT INSTRUMENT: Patient currently scored a level 4 of 7 on Functional Communication Measures for swallowing indicating a 40-59% limitation in function.    ASSESSMENT/ PLAN OF CARE:  Pt presents with limitations, noted below, that impede his ability to swallow safely. The skills of a therapist will be required to safely and effectively implement the following treatment plan to restore maximal level of function.    PROBLEMS:  1.  Dysphagia   LTG 1: (30 days) patient will increase ability to tolerate least restrictive diet and improve functional communication measure for swallowing to a 7 of 7   STG 1a: (14 days) patient will tolerate clear liquids without clinical sign or symptom of aspiration with 8 of 10 trials.   STG 1b: (14 days) patient will tolerate therapeutic trials of soft and regular solids without clinical sign or symptom of aspiration with 8 of 10 trials.   STG 1c: (14 days) patient/family teaching regarding diet recommendations, safe swallow strategies and signs and symptoms of aspiration.   TREATMENT: Dysphagia therapy to ensure diet tolerance, advance to least restrictive diet and analyze aspiration risk.    FREQUENCY/DURATION: Daily 5 times a week    REHAB POTENTIAL:  Pt has good rehab potential.  The following limitations may influence improvement/ length of tx medical status.    RECOMMENDATIONS:   1.   DIET: Clear liquids only until mental status clears    2.  POSITION: 90 degrees upright    3.  COMPENSATORY STRATEGIES: May require supervision with intake, oral meds in applesauce crush if needed.      Speech therapy will follow-up in the a.m. and attempt to advance diet

## 2024-09-29 NOTE — THERAPY EVALUATION
Acute Care - Speech Language Pathology   Swallow Initial Evaluation KARISSA Gates     Patient Name: Jaime Calix  : 1975  MRN: [id number]  Today's Date: 2024               Admit Date: 2024    Visit Dx:     ICD-10-CM ICD-9-CM   1. Hypothermia, initial encounter  T68.XXXA 991.6   2. Diabetic ketoacidosis with coma associated with other specified diabetes mellitus  E13.11 250.32   3. Dysphagia, oropharyngeal  R13.12 787.22     Patient Active Problem List   Diagnosis    DKA (diabetic ketoacidosis)    Metabolic acidosis    Hypothermia    Hypoxic respiratory failure    Leukocytosis    Hyperphosphatemia    CHARI (acute kidney injury)    Metabolic encephalopathy     Past Medical History:   Diagnosis Date    Sleep apnea      Past Surgical History:   Procedure Laterality Date    WISDOM TOOTH EXTRACTION         Inpatient Speech Pathology Dysphagia Evaluation        PAIN SCALE: None noted    PRECAUTIONS/CONTRAINDICATIONS: None noted    SUSPECTED ABUSE/NEGLECT/EXPLOITATION: None noted    SOCIAL/PSYCHOLOGICAL NEEDS/BARRIERS: Lives at home with family    PAST SOCIAL HISTORY: Lives at home    PRIOR FUNCTION: Independent    PATIENT GOALS/EXPECTATIONS: Did not report    HISTORY: This patient is a 49-year-old admitted with DKA.  Patient found unresponsive at home patient was intubated for less than 24 hours.  Patient does have altered mental status.    CURRENT DIET LEVEL: N.p.o.    OBJECTIVE:    TEST ADMINISTERED: Clinical dysphagia evaluation    COGNITION/SAFETY AWARENESS: Awake but confused/disoriented    BEHAVIORAL OBSERVATIONS: Cooperative with cueing    ORAL MOTOR EXAM: Difficult to fully assess    VOICE QUALITY: Within functional limits    REFLEX EXAM: Deferred    POSTURE: 90 degrees upright    FEEDING/SWALLOWING FUNCTION: Assessed with thin liquids and purée consistencies only    CLINICAL OBSERVATIONS: Oral stage is characterized by adequate bolus preparation and control for trials presented.  Disorganized swallow  noted with cueing required throughout due to patient's mental status.  Swallow completed with thin liquids, no clinical signs or symptoms of aspiration noted.  Appears to have adequate laryngeal elevation per palpation.  Swallow completed with purée consistencies, no clinical signs or symptoms of aspiration    DYSPHAGIA CRITERIA: Risk of aspiration    FUNCTIONAL ASSESSMENT INSTRUMENT: Patient currently scored a level 4 of 7 on Functional Communication Measures for swallowing indicating a 40-59% limitation in function.    ASSESSMENT/ PLAN OF CARE:  Pt presents with limitations, noted below, that impede his ability to swallow safely. The skills of a therapist will be required to safely and effectively implement the following treatment plan to restore maximal level of function.    PROBLEMS:  1.  Dysphagia   LTG 1: (30 days) patient will increase ability to tolerate least restrictive diet and improve functional communication measure for swallowing to a 7 of 7   STG 1a: (14 days) patient will tolerate clear liquids without clinical sign or symptom of aspiration with 8 of 10 trials.   STG 1b: (14 days) patient will tolerate therapeutic trials of soft and regular solids without clinical sign or symptom of aspiration with 8 of 10 trials.   STG 1c: (14 days) patient/family teaching regarding diet recommendations, safe swallow strategies and signs and symptoms of aspiration.   TREATMENT: Dysphagia therapy to ensure diet tolerance, advance to least restrictive diet and analyze aspiration risk.    FREQUENCY/DURATION: Daily 5 times a week    REHAB POTENTIAL:  Pt has good rehab potential.  The following limitations may influence improvement/ length of tx medical status.    RECOMMENDATIONS:   1.   DIET: Clear liquids only until mental status clears    2.  POSITION: 90 degrees upright    3.  COMPENSATORY STRATEGIES: May require supervision with intake, oral meds in applesauce crush if needed.      Speech therapy will follow-up in  the a.m. and attempt to advance diet    Pt/responsible party agrees with plan of care and has been informed of all alternatives, risks and benefits.        EDUCATION  The patient has been educated in the following areas:   Dysphagia (Swallowing Impairment).            Viktoriya Plascencia, SLP  9/29/2024

## 2024-09-29 NOTE — PLAN OF CARE
Goal Outcome Evaluation:  Plan of Care Reviewed With: patient           Outcome Evaluation: insulin drip restarted per MD order, NPO, 3.5 L NC. Wife at bedside. Weaver still in place. Medicated for stomach pain x1 per MAR. No complaints at this time.

## 2024-09-29 NOTE — PROGRESS NOTES
[facility]     Progress Note    Patient Name: Jaime Calix  : 1975  MRN: [id number]  Primary Care Physician:  Provider, No Known  Date of admission: 2024    Subjective   Subjective     Patient is critically ill in the ICU with septic shock, strep bacteremia, DKA    On nasal cannula on antibiotics  Glucose elevated  Anion gap back open    Objective   Objective     Vitals:   Temp:  [97.7 °F (36.5 °C)-99.1 °F (37.3 °C)] 98.8 °F (37.1 °C)  Heart Rate:  [] 77  Resp:  [15-23] 23  BP: ()/() 112/73  Flow (L/min):  [3.5] 3.5    Physical Exam   Much more awake today  Does follow commands  Result Review    Result Review:  I have personally reviewed the results from the time of this admission to 2024 10:25 EDT and agree with these findings:  [x]  Laboratory list / accordion  [x]  Microbiology  [x]  Radiology  [x]  EKG/Telemetry   []  Cardiology/Vascular   []  Pathology  []  Old records  []  Other:  Most notable findings include:   Blood cultures gram-positive cocci in chains      Assessment & Plan   Assessment / Plan     Brief Patient Summary:  Jaime Calix is a 49 y.o. male who presented to the hospital with hypothermia, septic shock noted to have gram-positive cocci in chains in blood, was on mechanical ventilator with DKA requiring vasopressors.    Active Hospital Problems:  Active Hospital Problems    Diagnosis     **DKA (diabetic ketoacidosis)     Metabolic acidosis     Hypothermia     Hypoxic respiratory failure     Leukocytosis     Hyperphosphatemia     CHARI (acute kidney injury)     Metabolic encephalopathy    Gram-positive bacteremia  Newly diagnosed diabetes  Diabetic ketoacidosis with coma  Septic shock, present on admission  Hypothermia  Hypophosphatemia  Hypokalemia    Plan:   Resume insulin as patient does have anion gap that is open back up and sugars very high  Start D5 water at 75 cc/h  Hopefully if the patient wakes up we can feed him and we can transition him  off  De-escalate antibiotics to ampicillin for Streptococcus bacteremia  I am unsure the etiology may need further imaging to workup this bacteria  Discontinue steroids  Levophed if needed  Will need diabetes education      Peptic ulcer prophylaxis Pepcid  VTE Prophylaxis: Subcutaneous heparin  Pharmacologic & mechanical VTE prophylaxis orders are present.        CODE STATUS:    Code Status (Patient has no pulse and is not breathing): CPR (Attempt to Resuscitate)  Medical Interventions (Patient has pulse or is breathing): Full Support    I, RADHA Harry, spent 15 minutes critical care time.  Electronically signed by RADHA Brown, 09/29/24, 5:58 PM EDT.    Total critical care time spent by our service 45 minutes  Patient is critically ill in ICU with DKA, respiratory failure, profound acidosis and acute kidney injury    I spent 30 minutes of critical care time excluding any procedure notes, spent in review, analysis, obtaining history and physical, formulating care plan, and I led multi-disciplinary critical care rounds with bedside nurse, respiratory therapist, clinical pharmacist and other allied services. I have discussed the case with primary service and other consultants as well.    Electronically signed by Kalyan Freeman DO, 09/29/24, 8:06 PM EDT.

## 2024-09-29 NOTE — CONSULTS
"Nutrition Services    Patient Name: Jaime Calix  YOB: 1975  MRN: [id number]  Admission date: 9/27/2024      CLINICAL NUTRITION ASSESSMENT      Reason for Assessment  Physician Consult and Tube Feeding Assessment   H&P:  Past Medical History:   Diagnosis Date    Sleep apnea         Current Problems:   Active Hospital Problems    Diagnosis     **DKA (diabetic ketoacidosis)     Metabolic acidosis     Hypothermia     Hypoxic respiratory failure     Leukocytosis     Hyperphosphatemia     CHARI (acute kidney injury)     Metabolic encephalopathy         Nutrition/Diet History         Narrative   RD consulted to provide enteral nutrition. Pt admitted to ICU with DKA requiring intubation and vasopressors on 9/27. Extubated yesterday, 9/28. Levophed and Precedex stopped. Currently receiving D5 LR @ 100 ml/hr. Hypernatremic with FW deficit of 3.8L. Glucsoe 395 H, much improved from admission Glucose of 954.      Anthropometrics        Current Height, Weight Height: 182.9 cm (72\")  Weight: 99.1 kg (218 lb 7.6 oz)   Current BMI Body mass index is 29.63 kg/m².   BMI Classification Overweight   % %   Adjusted Body Weight (ABW) N/A   Weight Hx  Wt Readings from Last 30 Encounters:   09/29/24 0500 99.1 kg (218 lb 7.6 oz)   09/27/24 1354 94.7 kg (208 lb 12.4 oz)          Wt Change Observation No recent measures to trend.     Estimated/Assessed Needs  Estimated Needs based on: Current Body Weight       Energy Requirements 22-25 kcal/kg    EST Needs (kcal/day) 0361-8073 kcal       Protein Requirements 0.8-1.0 g/kg   EST Daily Needs (g/day) 79-99 g       Fluid Requirements 1 ml/kcal    Estimated Needs (mL/day) 6880-1411 ml     Labs/Medications         Pertinent Labs Reviewed.   Results from last 7 days   Lab Units 09/29/24  0310 09/28/24  1449 09/28/24  1208 09/28/24  0804 09/27/24  1354 09/27/24  1349   SODIUM mmol/L 149*  --  147* 147*   < > 135*   POTASSIUM mmol/L 4.0 3.6 3.6 3.4*   < > 5.3*   CHLORIDE mmol/L " 112*  --  112* 112*   < > 94*   CO2 mmol/L 18.0*  --  21.5* 25.9   < > 4.7*   BUN mg/dL 50*  --  48* 51*   < > 55*   CREATININE mg/dL 1.97*  --  1.91* 2.01*   < > 2.44*   CALCIUM mg/dL 8.4*  --  8.2* 8.1*   < > 8.8   BILIRUBIN mg/dL  --   --   --   --   --  0.3   ALK PHOS U/L  --   --   --   --   --  199*   ALT (SGPT) U/L  --   --   --   --   --  20   AST (SGOT) U/L  --   --   --   --   --  17   GLUCOSE mg/dL 395*  --  135* 183*   < > 954*    < > = values in this interval not displayed.     Results from last 7 days   Lab Units 09/29/24  0431 09/29/24  0310 09/28/24  1208 09/28/24  0804   MAGNESIUM mg/dL  --  2.7* 2.8* 2.8*   PHOSPHORUS mg/dL  --  2.1* 0.6* 0.8*   HEMOGLOBIN g/dL 12.3*  --   --   --    HEMATOCRIT % 33.8*  --   --   --      COVID19   Date Value Ref Range Status   09/27/2024 Not Detected Not Detected - Ref. Range Final     Lab Results   Component Value Date    HGBA1C 13.90 (H) 09/27/2024         Pertinent Medications Reviewed.     Malnutrition Severity Assessment              Nutrition Diagnosis         Nutrition Dx Problem 1 Inadequate energy Intake related to Inability to consume sufficient energy as evidenced by NPO.     Nutrition Intervention           Current Nutrition Orders & Evaluation of Intake       Current PO Diet NPO Diet NPO Type: Tube Feeding   Supplement Orders Placed This Encounter      Feeding Tube Insertion - Cortrak System      Feeding Tube Insertion - Cortrak System      Tube Feeding: Formula: Diabetisource AC; Feeding Type: Continuous; Start at: 25 mL/hr; Then Advance By: 25 mL/hr; Every: 6 hours; To Goal Rate of: 80 mL/hr; Water Flush: Other; Other: 80; Every: 2 hours; Water Bolus: None           Nutrition Intervention/Prescription        Diabetisource AC @ 80 ml/hr x 22 hr, flush with 80 ml q2h  Provides: 2112 kcal, 106 g pro, 2042 ml (1082 ml FW +960 ml flush)    Replacing half FW deficit today. Receiving LR @100 ml/hr.   Adjust water flush as needed to correct Na+ lab.          Medical Nutrition Therapy/Nutrition Education          Learner     Readiness N/A  N/A     Method     Response N/A  N/A     Monitor/Evaluation        Monitor Per protocol, Pertinent labs, EN delivery/tolerance, POC/GOC     Nutrition Discharge Plan         To be determined     Electronically signed by:  Dottie Garcia RD  09/29/24 07:51 EDT

## 2024-09-30 LAB
ANION GAP SERPL CALCULATED.3IONS-SCNC: 10.2 MMOL/L (ref 5–15)
ANION GAP SERPL CALCULATED.3IONS-SCNC: 10.3 MMOL/L (ref 5–15)
ANION GAP SERPL CALCULATED.3IONS-SCNC: 12 MMOL/L (ref 5–15)
ANION GAP SERPL CALCULATED.3IONS-SCNC: 9.4 MMOL/L (ref 5–15)
BUN SERPL-MCNC: 28 MG/DL (ref 6–20)
BUN SERPL-MCNC: 32 MG/DL (ref 6–20)
BUN SERPL-MCNC: 38 MG/DL (ref 6–20)
BUN SERPL-MCNC: 40 MG/DL (ref 6–20)
BUN/CREAT SERPL: 18.9 (ref 7–25)
BUN/CREAT SERPL: 19.2 (ref 7–25)
BUN/CREAT SERPL: 22 (ref 7–25)
BUN/CREAT SERPL: 24.1 (ref 7–25)
CALCIUM SPEC-SCNC: 8.3 MG/DL (ref 8.6–10.5)
CALCIUM SPEC-SCNC: 8.4 MG/DL (ref 8.6–10.5)
CALCIUM SPEC-SCNC: 8.4 MG/DL (ref 8.6–10.5)
CALCIUM SPEC-SCNC: 8.6 MG/DL (ref 8.6–10.5)
CHLORIDE SERPL-SCNC: 113 MMOL/L (ref 98–107)
CHLORIDE SERPL-SCNC: 114 MMOL/L (ref 98–107)
CHLORIDE SERPL-SCNC: 118 MMOL/L (ref 98–107)
CHLORIDE SERPL-SCNC: 118 MMOL/L (ref 98–107)
CK SERPL-CCNC: 520 U/L (ref 20–200)
CO2 SERPL-SCNC: 23.8 MMOL/L (ref 22–29)
CO2 SERPL-SCNC: 24 MMOL/L (ref 22–29)
CO2 SERPL-SCNC: 25.7 MMOL/L (ref 22–29)
CO2 SERPL-SCNC: 26.6 MMOL/L (ref 22–29)
CREAT SERPL-MCNC: 1.48 MG/DL (ref 0.76–1.27)
CREAT SERPL-MCNC: 1.66 MG/DL (ref 0.76–1.27)
CREAT SERPL-MCNC: 1.67 MG/DL (ref 0.76–1.27)
CREAT SERPL-MCNC: 1.73 MG/DL (ref 0.76–1.27)
DEPRECATED RDW RBC AUTO: 46 FL (ref 37–54)
EGFRCR SERPLBLD CKD-EPI 2021: 47.2 ML/MIN/1.73
EGFRCR SERPLBLD CKD-EPI 2021: 49.2 ML/MIN/1.73
EGFRCR SERPLBLD CKD-EPI 2021: 49.6 ML/MIN/1.73
EGFRCR SERPLBLD CKD-EPI 2021: 56.9 ML/MIN/1.73
ERYTHROCYTE [DISTWIDTH] IN BLOOD BY AUTOMATED COUNT: 13.5 % (ref 12.3–15.4)
GLUCOSE BLDC GLUCOMTR-MCNC: 116 MG/DL (ref 70–99)
GLUCOSE BLDC GLUCOMTR-MCNC: 120 MG/DL (ref 70–99)
GLUCOSE BLDC GLUCOMTR-MCNC: 124 MG/DL (ref 70–99)
GLUCOSE BLDC GLUCOMTR-MCNC: 126 MG/DL (ref 70–99)
GLUCOSE BLDC GLUCOMTR-MCNC: 126 MG/DL (ref 70–99)
GLUCOSE BLDC GLUCOMTR-MCNC: 133 MG/DL (ref 70–99)
GLUCOSE BLDC GLUCOMTR-MCNC: 133 MG/DL (ref 70–99)
GLUCOSE BLDC GLUCOMTR-MCNC: 135 MG/DL (ref 70–99)
GLUCOSE BLDC GLUCOMTR-MCNC: 139 MG/DL (ref 70–99)
GLUCOSE BLDC GLUCOMTR-MCNC: 144 MG/DL (ref 70–99)
GLUCOSE BLDC GLUCOMTR-MCNC: 147 MG/DL (ref 70–99)
GLUCOSE BLDC GLUCOMTR-MCNC: 148 MG/DL (ref 70–99)
GLUCOSE BLDC GLUCOMTR-MCNC: 150 MG/DL (ref 70–99)
GLUCOSE BLDC GLUCOMTR-MCNC: 161 MG/DL (ref 70–99)
GLUCOSE BLDC GLUCOMTR-MCNC: 169 MG/DL (ref 70–99)
GLUCOSE BLDC GLUCOMTR-MCNC: 191 MG/DL (ref 70–99)
GLUCOSE BLDC GLUCOMTR-MCNC: 191 MG/DL (ref 70–99)
GLUCOSE BLDC GLUCOMTR-MCNC: 201 MG/DL (ref 70–99)
GLUCOSE BLDC GLUCOMTR-MCNC: 203 MG/DL (ref 70–99)
GLUCOSE BLDC GLUCOMTR-MCNC: 206 MG/DL (ref 70–99)
GLUCOSE BLDC GLUCOMTR-MCNC: 211 MG/DL (ref 70–99)
GLUCOSE BLDC GLUCOMTR-MCNC: 211 MG/DL (ref 70–99)
GLUCOSE BLDC GLUCOMTR-MCNC: 229 MG/DL (ref 70–99)
GLUCOSE SERPL-MCNC: 113 MG/DL (ref 65–99)
GLUCOSE SERPL-MCNC: 125 MG/DL (ref 65–99)
GLUCOSE SERPL-MCNC: 165 MG/DL (ref 65–99)
GLUCOSE SERPL-MCNC: 188 MG/DL (ref 65–99)
HCT VFR BLD AUTO: 35.9 % (ref 37.5–51)
HGB BLD-MCNC: 12.8 G/DL (ref 13–17.7)
MAGNESIUM SERPL-MCNC: 2.6 MG/DL (ref 1.6–2.6)
MCH RBC QN AUTO: 32.9 PG (ref 26.6–33)
MCHC RBC AUTO-ENTMCNC: 35.7 G/DL (ref 31.5–35.7)
MCV RBC AUTO: 92.3 FL (ref 79–97)
PHOSPHATE SERPL-MCNC: 1.8 MG/DL (ref 2.5–4.5)
PLATELET # BLD AUTO: 72 10*3/MM3 (ref 140–450)
PMV BLD AUTO: 12.4 FL (ref 6–12)
POTASSIUM SERPL-SCNC: 2.7 MMOL/L (ref 3.5–5.2)
POTASSIUM SERPL-SCNC: 3.1 MMOL/L (ref 3.5–5.2)
POTASSIUM SERPL-SCNC: 3.1 MMOL/L (ref 3.5–5.2)
POTASSIUM SERPL-SCNC: 3.2 MMOL/L (ref 3.5–5.2)
RBC # BLD AUTO: 3.89 10*6/MM3 (ref 4.14–5.8)
SODIUM SERPL-SCNC: 147 MMOL/L (ref 136–145)
SODIUM SERPL-SCNC: 150 MMOL/L (ref 136–145)
SODIUM SERPL-SCNC: 154 MMOL/L (ref 136–145)
SODIUM SERPL-SCNC: 154 MMOL/L (ref 136–145)
WBC NRBC COR # BLD AUTO: 9.85 10*3/MM3 (ref 3.4–10.8)

## 2024-09-30 PROCEDURE — 82542 COL CHROMOTOGRAPHY QUAL/QUAN: CPT | Performed by: INTERNAL MEDICINE

## 2024-09-30 PROCEDURE — 80048 BASIC METABOLIC PNL TOTAL CA: CPT | Performed by: NURSE PRACTITIONER

## 2024-09-30 PROCEDURE — 25010000002 AMPICILLIN PER 500 MG: Performed by: NURSE PRACTITIONER

## 2024-09-30 PROCEDURE — 94799 UNLISTED PULMONARY SVC/PX: CPT

## 2024-09-30 PROCEDURE — 0 DEXTROSE 5 % SOLUTION: Performed by: INTERNAL MEDICINE

## 2024-09-30 PROCEDURE — 82550 ASSAY OF CK (CPK): CPT | Performed by: INTERNAL MEDICINE

## 2024-09-30 PROCEDURE — 99233 SBSQ HOSP IP/OBS HIGH 50: CPT | Performed by: INTERNAL MEDICINE

## 2024-09-30 PROCEDURE — 83735 ASSAY OF MAGNESIUM: CPT | Performed by: NURSE PRACTITIONER

## 2024-09-30 PROCEDURE — 99291 CRITICAL CARE FIRST HOUR: CPT | Performed by: INTERNAL MEDICINE

## 2024-09-30 PROCEDURE — 86022 PLATELET ANTIBODIES: CPT | Performed by: INTERNAL MEDICINE

## 2024-09-30 PROCEDURE — 84100 ASSAY OF PHOSPHORUS: CPT | Performed by: NURSE PRACTITIONER

## 2024-09-30 PROCEDURE — 82948 REAGENT STRIP/BLOOD GLUCOSE: CPT

## 2024-09-30 PROCEDURE — 25010000002 POTASSIUM CHLORIDE 10 MEQ/100ML SOLUTION: Performed by: NURSE PRACTITIONER

## 2024-09-30 PROCEDURE — 92526 ORAL FUNCTION THERAPY: CPT

## 2024-09-30 PROCEDURE — 25810000003 SODIUM CHLORIDE 0.9 % SOLUTION: Performed by: INTERNAL MEDICINE

## 2024-09-30 PROCEDURE — 25010000002 POTASSIUM CHLORIDE 10 MEQ/100ML SOLUTION: Performed by: STUDENT IN AN ORGANIZED HEALTH CARE EDUCATION/TRAINING PROGRAM

## 2024-09-30 PROCEDURE — 85027 COMPLETE CBC AUTOMATED: CPT | Performed by: INTERNAL MEDICINE

## 2024-09-30 RX ORDER — POTASSIUM CHLORIDE 750 MG/1
40 CAPSULE, EXTENDED RELEASE ORAL ONCE
Status: DISCONTINUED | OUTPATIENT
Start: 2024-09-30 | End: 2024-09-30

## 2024-09-30 RX ORDER — POTASSIUM CHLORIDE 750 MG/1
40 CAPSULE, EXTENDED RELEASE ORAL 2 TIMES DAILY WITH MEALS
Status: COMPLETED | OUTPATIENT
Start: 2024-09-30 | End: 2024-09-30

## 2024-09-30 RX ORDER — FAMOTIDINE 20 MG/1
20 TABLET, FILM COATED ORAL
Status: DISCONTINUED | OUTPATIENT
Start: 2024-09-30 | End: 2024-10-02 | Stop reason: HOSPADM

## 2024-09-30 RX ORDER — FENTANYL/ROPIVACAINE/NS/PF 2-625MCG/1
15 PLASTIC BAG, INJECTION (ML) EPIDURAL ONCE
Status: COMPLETED | OUTPATIENT
Start: 2024-09-30 | End: 2024-09-30

## 2024-09-30 RX ORDER — POTASSIUM CHLORIDE 7.45 MG/ML
10 INJECTION INTRAVENOUS
Status: COMPLETED | OUTPATIENT
Start: 2024-09-30 | End: 2024-09-30

## 2024-09-30 RX ADMIN — DEXTROSE MONOHYDRATE 125 ML/HR: 50 INJECTION, SOLUTION INTRAVENOUS at 17:29

## 2024-09-30 RX ADMIN — FAMOTIDINE 20 MG: 10 INJECTION INTRAVENOUS at 09:07

## 2024-09-30 RX ADMIN — Medication 10 ML: at 09:12

## 2024-09-30 RX ADMIN — AMPICILLIN SODIUM 2 G: 2 INJECTION, POWDER, FOR SOLUTION INTRAVENOUS at 01:17

## 2024-09-30 RX ADMIN — CHLORHEXIDINE GLUCONATE 15 ML: 1.2 RINSE ORAL at 09:11

## 2024-09-30 RX ADMIN — POTASSIUM PHOSPHATES 15 MMOL: 236; 224 INJECTION, SOLUTION INTRAVENOUS at 09:07

## 2024-09-30 RX ADMIN — Medication 10 ML: at 20:37

## 2024-09-30 RX ADMIN — DEXTROSE MONOHYDRATE 125 ML/HR: 50 INJECTION, SOLUTION INTRAVENOUS at 23:55

## 2024-09-30 RX ADMIN — DEXTROSE MONOHYDRATE 125 ML/HR: 50 INJECTION, SOLUTION INTRAVENOUS at 10:00

## 2024-09-30 RX ADMIN — POTASSIUM CHLORIDE 40 MEQ: 750 CAPSULE, EXTENDED RELEASE ORAL at 01:17

## 2024-09-30 RX ADMIN — AMPICILLIN SODIUM 2 G: 2 INJECTION, POWDER, FOR SOLUTION INTRAVENOUS at 20:37

## 2024-09-30 RX ADMIN — APIXABAN 10 MG: 5 TABLET, FILM COATED ORAL at 20:37

## 2024-09-30 RX ADMIN — POTASSIUM CHLORIDE 10 MEQ: 7.46 INJECTION, SOLUTION INTRAVENOUS at 01:17

## 2024-09-30 RX ADMIN — POTASSIUM CHLORIDE 10 MEQ: 7.46 INJECTION, SOLUTION INTRAVENOUS at 15:02

## 2024-09-30 RX ADMIN — FAMOTIDINE 20 MG: 20 TABLET ORAL at 17:18

## 2024-09-30 RX ADMIN — INSULIN HUMAN 10.3 UNITS/HR: 1 INJECTION, SOLUTION INTRAVENOUS at 13:49

## 2024-09-30 RX ADMIN — AMPICILLIN SODIUM 2 G: 2 INJECTION, POWDER, FOR SOLUTION INTRAVENOUS at 13:48

## 2024-09-30 RX ADMIN — AMPICILLIN SODIUM 2 G: 2 INJECTION, POWDER, FOR SOLUTION INTRAVENOUS at 04:59

## 2024-09-30 RX ADMIN — APIXABAN 10 MG: 5 TABLET, FILM COATED ORAL at 09:07

## 2024-09-30 RX ADMIN — POTASSIUM CHLORIDE 10 MEQ: 7.46 INJECTION, SOLUTION INTRAVENOUS at 16:04

## 2024-09-30 RX ADMIN — AMPICILLIN SODIUM 2 G: 2 INJECTION, POWDER, FOR SOLUTION INTRAVENOUS at 09:07

## 2024-09-30 RX ADMIN — AMPICILLIN SODIUM 2 G: 2 INJECTION, POWDER, FOR SOLUTION INTRAVENOUS at 17:18

## 2024-09-30 RX ADMIN — POTASSIUM CHLORIDE 10 MEQ: 7.46 INJECTION, SOLUTION INTRAVENOUS at 02:27

## 2024-09-30 RX ADMIN — POTASSIUM CHLORIDE 10 MEQ: 7.46 INJECTION, SOLUTION INTRAVENOUS at 17:18

## 2024-09-30 RX ADMIN — POTASSIUM CHLORIDE 10 MEQ: 7.46 INJECTION, SOLUTION INTRAVENOUS at 18:17

## 2024-09-30 RX ADMIN — POTASSIUM CHLORIDE 40 MEQ: 750 CAPSULE, EXTENDED RELEASE ORAL at 17:24

## 2024-09-30 RX ADMIN — SENNOSIDES AND DOCUSATE SODIUM 2 TABLET: 50; 8.6 TABLET ORAL at 09:07

## 2024-09-30 NOTE — CONSULTS
"Nutrition Services    Patient Name: Jaime Calix  YOB: 1975  MRN: [id number]  Admission date: 9/27/2024      CLINICAL NUTRITION ASSESSMENT      Reason for Assessment  Follow Up   H&P:  Past Medical History:   Diagnosis Date    Sleep apnea         Current Problems:   Active Hospital Problems    Diagnosis     **DKA (diabetic ketoacidosis)     Metabolic acidosis     Hypothermia     Hypoxic respiratory failure     Leukocytosis     Hyperphosphatemia     CHARI (acute kidney injury)     Metabolic encephalopathy         Nutrition/Diet History         Narrative   Upon my visit, patient awake and pleasant. Enteral access never obtained. Diet upgraded to FLLIQ. SLP evaluated patient this AM and recommends to advance diet as tolerated to a regular diet with thin liquids. RD will d/c Cortrak order and d/c TF order.      Anthropometrics        Current Height, Weight Height: 182.9 cm (72\")  Weight: 99.1 kg (218 lb 7.6 oz)   Current BMI Body mass index is 29.63 kg/m².   BMI Classification Overweight   % %   Adjusted Body Weight (ABW) N/A   Weight Hx  Wt Readings from Last 30 Encounters:   09/29/24 0500 99.1 kg (218 lb 7.6 oz)   09/27/24 1354 94.7 kg (208 lb 12.4 oz)          Wt Change Observation No recent measures to trend.     Estimated/Assessed Needs  Estimated Needs based on: Current Body Weight       Energy Requirements 22-25 kcal/kg    EST Needs (kcal/day) 7720-4618 kcal       Protein Requirements 0.8-1.0 g/kg   EST Daily Needs (g/day) 79-99 g       Fluid Requirements 1 ml/kcal    Estimated Needs (mL/day) 9566-0339 ml     Labs/Medications         Pertinent Labs Reviewed.   Results from last 7 days   Lab Units 09/30/24  0559 09/30/24  0025 09/29/24  1818 09/27/24  1354 09/27/24  1349   SODIUM mmol/L 154* 154* 156*   < > 135*   POTASSIUM mmol/L 3.1* 2.7* 3.7   < > 5.3*   CHLORIDE mmol/L 118* 118* 121*   < > 94*   CO2 mmol/L 26.6 25.7 23.5   < > 4.7*   BUN mg/dL 38* 40* 46*   < > 55*   CREATININE mg/dL " 1.73* 1.66* 1.88*   < > 2.44*   CALCIUM mg/dL 8.6 8.3* 8.7   < > 8.8   BILIRUBIN mg/dL  --   --   --   --  0.3   ALK PHOS U/L  --   --   --   --  199*   ALT (SGPT) U/L  --   --   --   --  20   AST (SGOT) U/L  --   --   --   --  17   GLUCOSE mg/dL 113* 188* 169*   < > 954*    < > = values in this interval not displayed.     Results from last 7 days   Lab Units 09/30/24  0559 09/29/24  0431 09/29/24  0310 09/28/24  1208   MAGNESIUM mg/dL 2.6  --  2.7* 2.8*   PHOSPHORUS mg/dL 1.8*  --  2.1* 0.6*   HEMOGLOBIN g/dL 12.8*   < >  --   --    HEMATOCRIT % 35.9*   < >  --   --     < > = values in this interval not displayed.     COVID19   Date Value Ref Range Status   09/27/2024 Not Detected Not Detected - Ref. Range Final     Lab Results   Component Value Date    HGBA1C 13.90 (H) 09/27/2024         Pertinent Medications Reviewed.     Malnutrition Severity Assessment              Nutrition Diagnosis         Nutrition Dx Problem 1 Inadequate energy Intake related to Inability to consume sufficient energy as evidenced by NPO.     Nutrition Intervention           Current Nutrition Orders & Evaluation of Intake       Current PO Diet Diet: Liquid, Diabetic; Full Liquid; Consistent Carbohydrate; Fluid Consistency: Thin (IDDSI 0)   Supplement Orders Placed This Encounter      Patient is on Glucommander           Nutrition Intervention/Prescription        Advance diet as tolerated per SLP recommendation.        Medical Nutrition Therapy/Nutrition Education          Learner     Readiness N/A  N/A     Method     Response N/A  N/A     Monitor/Evaluation        Monitor Per protocol, PO intake, Pertinent labs, POC/GOC     Nutrition Discharge Plan         To be determined     Electronically signed by:  Dottie Garcia RD  09/30/24 10:55 EDT

## 2024-09-30 NOTE — CONSULTS
Met with patient and family at bedside. Patient has not been diagnosed with diabetes prior; however, current A1c is 13.9% with an estimated average glucose of 352 mg/dl.     Patient appears tired and is unengaging at this time. Discussed A1c results and goal, complications of prolonged hyperglycemia, checking blood glucose numerous times a day once discharged, and going home with two different kinds of insulin. Able to elicit minimal responses. Per patient's family, he has other family members with diabetes (also on insulin) who will be able to be resources at home.     Patient resides in [place] and is hoping to have insulin and glucometer prior to discharge. Spoke with pharmacy and have ordered the Accu-Chek Guide which is covered by patient's insurance. Family also plans on assisting him in finding PCP I [place]. No further needs or questions at this time. Will educate on diet and insulin administration tomorrow when patient is more alert.     Recommend on discharge:  Rx for Lantus Solostar insulin pen  Rx for Humalog Kwikpen insulin pen  Rx for Accu-Chek Guide glucometer, test strips, lancets (pended)  Rx for insulin pen needles (pended)  Rx for alcohol swabs (pended)

## 2024-09-30 NOTE — PROGRESS NOTES
[facility]     Progress Note    Patient Name: Jaime Calix  : 1975  MRN: [id number]  Primary Care Physician:  Provider, No Known  Date of admission: 2024    Subjective   Subjective     Patient is critically ill in the ICU with septic shock, strep bacteremia, DKA    Remains on insulin drip.  Needed 75 units yesterday  Still on D5W.  Sodium still elevated  Appetite poor but diet clear per speech therapy.  Family does have some hesitancy with him eating  He is more awake and less weak and fatigue  No respiratory complaints  Repeat blood cultures were negative  Multiple electrolyte disturbances this morning  Platelets are trending down  Not complaining of any dental pain and not complaining of any wounds    Objective   Objective     Vitals:   Temp:  [98.2 °F (36.8 °C)-99.1 °F (37.3 °C)] 98.6 °F (37 °C)  Heart Rate:  [59-91] 65  Resp:  [13-18] 18  BP: (104-157)/(56-85) 157/85  Flow (L/min):  [3.5] 3.5    Vital Signs Reviewed  Critically ill male, more awake and alert  HEENT:  PERRL, EOMI.  OP, nares clear, no sinus tenderness  Chest:  good aeration, clear to auscultation bilaterally, tympanic to percussion bilaterally, no work of breathing noted  CV: RRR, no MGR, pulses 2+, equal.  Abd:  Soft, NT, ND, + BS, no HSM  EXT:  no clubbing, no cyanosis, no edema  Neuro:  A&Ox3, CN grossly intact, no focal deficits.  Skin: No rashes or lesions noted      Result Review    Result Review:  I have personally reviewed the results from the time of this admission to 2024 10:47 EDT and agree with these findings:  [x]  Laboratory list / accordion  [x]  Microbiology  [x]  Radiology  [x]  EKG/Telemetry   [x]  Cardiology/Vascular   []  Pathology  []  Old records  []  Other:  Most notable findings include:       Lab 24  0559 24  0025 24  1818 24  1207 24  0431 24  0310 24  1449 24  1208 24  0804 24  0523 24  0409 24  0007 24  2351  09/27/24  1931 09/27/24  1600 09/27/24  1354 09/27/24  1349   WBC 9.85  --   --   --  10.06  --   --   --   --   --  12.93*  --   --   --  29.30*  --  33.61*   HEMOGLOBIN 12.8*  --   --   --  12.3*  --   --   --   --   --  14.1  --   --   --  12.3*  --  15.2   HEMATOCRIT 35.9*  --   --   --  33.8*  --   --   --   --   --  40.0  --   --   --  37.4*  --  47.8   PLATELETS 72*  --   --   --  87*  --   --   --   --   --  112*  --   --   --  135*  --  189   SODIUM 154* 154* 156* 151*  --  149*  --  147* 147*  --  147*  --  145   < > 143  --  135*   POTASSIUM 3.1* 2.7* 3.7 4.2  --  4.0 3.6 3.6 3.4*  --  3.2*  --  4.1   < > 3.6  --  5.3*   CHLORIDE 118* 118* 121* 114*  --  112*  --  112* 112*  --  107  --  103   < > 117*  --  94*   CO2 26.6 25.7 23.5 16.4*  --  18.0*  --  21.5* 25.9  --  18.2*  --  12.4*   < > 5.4*  --  4.7*   BUN 38* 40* 46* 52*  --  50*  --  48* 51*  --  56*  --  55*   < > 37*  --  55*   CREATININE 1.73* 1.66* 1.88* 2.09*  --  1.97*  --  1.91* 2.01*  --  2.13*   < > 2.01*   < > 1.27   < > 2.44*   GLUCOSE 113* 188* 169* 422*  --  395*  --  135* 183*  --  434*  --  614*   < > 558*  --  954*   CALCIUM 8.6 8.3* 8.7 8.3*  --  8.4*  --  8.2* 8.1*  --  8.6  --  7.9*   < > 4.4*  --  8.8   PHOSPHORUS 1.8*  --   --   --   --  2.1*  --  0.6* 0.8* 0.3* <0.3*  --  2.8   < > 5.4*  --  11.2*   TOTAL PROTEIN  --   --   --   --   --   --   --   --   --   --   --   --   --   --   --   --  6.4   ALBUMIN  --   --   --   --   --   --   --   --   --   --   --   --   --   --   --   --  3.5   GLOBULIN  --   --   --   --   --   --   --   --   --   --   --   --   --   --   --   --  2.9    < > = values in this interval not displayed.     Results for orders placed during the hospital encounter of 09/27/24    Adult Transthoracic Echo Complete W/ Cont if Necessary Per Protocol    Interpretation Summary    Left ventricular ejection fraction appears to be 56 - 60%.    Left ventricular diastolic function was normal.    Blood cultures  with alphahemolytic strep species        Assessment & Plan   Assessment / Plan       Active Hospital Problems:  Active Hospital Problems    Diagnosis     **DKA (diabetic ketoacidosis)     Metabolic acidosis     Hypothermia     Hypoxic respiratory failure     Leukocytosis     Hyperphosphatemia     CHARI (acute kidney injury)     Metabolic encephalopathy    Streptococcal bacteremia  Newly diagnosed diabetes, type II.  A1c greater than 13  Diabetic ketoacidosis with coma  Septic shock, present on admission  Hypothermia  Hypophosphatemia  Hypokalemia  Hypernatremia  Dehydration  Thrombocytopenia    Plan:   Still requiring about 70+ units of insulin daily on insulin drip.  Given he is still having somewhat poor appetite and having to go up on the D5W I will do insulin drip ongoing for now  Sodium is not a better.  Increase D5W to 125 mL/h.  No other diet has cleared the patient recommended a diet I encouraged her oral water intake and fluid intake.  Will repeat renal panel this afternoon  Trend renal panel and electrolytes.  Replace potassium and phosphorus IV  Question if this is patient's baseline creatinine  CPK repeated this morning and improved  Continue ampicillin for streptococcal bacteremia.  Repeat blood cultures negative.  Will likely need 10 to 14 days of therapy and will de-escalate accordingly  Given blood cultures have cleared, he has adequate dentition and no obvious wounds, will hold off on pursuing further workup for source of infection  Monitor CBC.  Platelets have slowly been trending down since admission.  Has never been hospitalized or exposed to subcutaneous heparin until this admission.  His 4T score is intermediate probability for heparin-induced thrombocytopenia.  I will change this heparin over to Eliquis and check a HIT panel with RUTH to rule out heparin-induced thrombocytopenia.  Will need diabetic education.  Will likely need to go home with insulin at discharge  DC Weaver catheter    Peptic  ulcer prophylaxis Pepcid  VTE Prophylaxis: Subcutaneous heparin  Pharmacologic & mechanical VTE prophylaxis orders are present.    CODE STATUS:    Code Status (Patient has no pulse and is not breathing): CPR (Attempt to Resuscitate)  Medical Interventions (Patient has pulse or is breathing): Full Support      The patient is critically ill in the ICU with septic shock, streptococcal bacteremia, multiple electrolyte disturbances, dehydration, DKA without coma, new diagnosis of diabetes. Bedside rounds were performed with nursing staff, respiratory therapy, pharmacy, nutritional services, social work. I have personally reviewed the chart, labs and any pertinent imaging available.  I have spent 33 minutes of critical care time, excluding procedures, in the care of this patient.    Electronically signed by Oren Molina MD, 09/30/24, 10:51 AM EDT.

## 2024-09-30 NOTE — THERAPY TREATMENT NOTE
Acute Care - Speech Language Pathology   Swallow Treatment Note KARISSA Gates     Patient Name: Jaime Calix  : 1975  MRN: [id number]  Today's Date: 2024               Admit Date: 2024    Visit Dx:     ICD-10-CM ICD-9-CM   1. Hypothermia, initial encounter  T68.XXXA 991.6   2. Diabetic ketoacidosis with coma associated with other specified diabetes mellitus  E13.11 250.32   3. Dysphagia, oropharyngeal  R13.12 787.22     Patient Active Problem List   Diagnosis    DKA (diabetic ketoacidosis)    Metabolic acidosis    Hypothermia    Hypoxic respiratory failure    Leukocytosis    Hyperphosphatemia    CHARI (acute kidney injury)    Metabolic encephalopathy     Past Medical History:   Diagnosis Date    Sleep apnea      Past Surgical History:   Procedure Laterality Date    WISDOM TOOTH EXTRACTION     SPEECH PATHOLOGY DYSPHAGIA TREATMENT    Subjective/Behavioral Observations: Patient seen at bedside.  Patient more alert and oriented      Current Diet: Full liquid diet      Treatment received: Assist with thin liquids, purée consistencies and soft solids      Results of treatment: Oral stage is characterized by good bolus preparation and control.  Mildly prolonged mastication due to patient not having dentures available.  Family plans to bring in dentures.  No oral residue noted after the swallow.  Patient with good laryngeal elevation per palpation and denies globus sensation after completion of swallow.  No clinical signs or symptoms of aspiration were noted.  Vocal quality remained clear.    Progress toward goals: Progress noted    Barriers to Achieving goals: Medical status      Plan of care:/changes in plan: Advance diet as tolerated to regular and thin liquids.  Ask family to bring in dentures.  Family voicing concern about advancing to regular diet and prefers patient to remain on full liquid diet.  Discussed with intensivist.      EDUCATION  The patient has been educated in the following areas:    Dysphagia (Swallowing Impairment).              Viktoriya Plascencia, SLP  9/30/2024

## 2024-09-30 NOTE — PLAN OF CARE
Goal Outcome Evaluation:         Patient remains on insulin gtt, D5W, IV abx. VSS, patient sat up in bed today. PT to see him tomorrow.

## 2024-09-30 NOTE — PLAN OF CARE
Goal Outcome Evaluation:              Outcome Evaluation: AxOx4. Weaver flushed x1 for leaking r/t sediment. Wore home cpap.

## 2024-09-30 NOTE — PROGRESS NOTES
[facility]   Hospitalist Progress Note  Date: 2024  Patient Name: Jaime Calix  : 1975  MRN: [id number]  Date of admission: 2024      Subjective   Subjective     Chief Complaint: Patient found unresponsive at the back of his truck    Summary:   Patient is a 49 y.o. year old male who presents to the emergency department for evaluation of unresponsive.  Patient has been missing for approximately 24 hours and wife and [employer] supervisor were unable to locate him.  Bystander from a truck stop called 911 to assist with the patient that appeared to be unresponsive in the back of his truck.  Uncertain as to how long he has been back there.  Upon EMS arrival patient was unresponsive and cold to the touch.  They do report a low axillary temperature.  Due to patient being unresponsive he was intubated in the field.  He was also found to be hypotensive and hypothermic.  Workup showed DKA.  Patient has no prior history of diabetes although it runs in the family.  Per patients wife patient apparently has been having nausea and vomiting for past several weeks.  Patient extubated on  to 2 L.  Thrombocytopenia workup as per pulmonary started on Eliquis for HIT.    Interval Followup:   Remains on 2 L.  Using home CPAP  Patient is sleepy during my rounds.  Wife at bedside.  Per wife patient has been appropriate he is just resting  Cleared by speech therapy for regular diet  Continues on insulin drip although gap is closed but patient not eating well and sodium is still high requiring D5 drip   Denies any chest pain, abdominal pain.  Good urine output.  Weaver catheter has been DC'd  Review of Systems   All systems were reviewed and negative except for: Limited as patient bit lethargic but as per summary and interval follow-up    Objective   Objective     Vitals:   Temp:  [98.2 °F (36.8 °C)-99.1 °F (37.3 °C)] 98.2 °F (36.8 °C)  Heart Rate:  [59-80] 65  Resp:  [18] 18  BP: (118-164)/(61-97)  158/93  Physical Exam    Constitutional: Lethargic, no acute distress   Eyes: Pupils equal, sclerae anicteric, no conjunctival injection   HENT: NCAT, mucous membranes moist   Neck: Supple, no thyromegaly, no lymphadenopathy, trachea midline   Respiratory: Decreased to auscultation bilaterally, nonlabored respirations    Cardiovascular: Tachycardic, no murmurs, rubs, or gallops, palpable pedal pulses bilaterally   Gastrointestinal: Positive bowel sounds, soft, nontender, nondistended   Musculoskeletal: No bilateral ankle edema, no clubbing or cyanosis to extremities   Psychiatric: Appropriate affect, cooperative   Neurologic: Oriented , strength symmetric in all extremities, Cranial Nerves grossly intact to confrontation, speech clear   Skin: No rashes       Result Review    Result Review:  I have personally reviewed the results for the past 24 hours and agree with these findings:  [x]  Laboratory  [x]  Microbiology  []  Radiology  []  EKG/Telemetry   []  Cardiology/Vascular   []  Pathology  [x]  Old records  [x]  Other: Medications    Assessment & Plan   Assessment / Plan     Assessment:  Acute metabolic encephalopathy/unresponsive.  Improving.  DKA.  Resolved  Hypothermic.  Resolved  Hypoxemia.  Intubated in field.  Extubated September 28.  Improving  Acute kidney injury.  Baseline not known  Thrombocytopenia.  Worsening  New diagnosis of diabetes mellitus hemoglobin A1c of 13.9%.  Strep bacteremia.  Streptococcus alphahemolytic  Septic shock present admission as patient has hypotension, leukocytosis and lactic acidosis with hypothermia.  Resolved  Rhabdo.  Improving  Hypophosphatemia.  Supplemented  Hypokalemia.  Supplemented  Hyper natremia.  Free water deficit  Hypomagnesemia.  Metabolic acidosis due to lactic acidosis and DKA.  Resolved  History of obstructive sleep apnea.  On home CPAP    Plan:  Continue supplemental oxygen keep sats more than 90%  As needed IV Ativan for agitation and restlessness  D5 water  for hypernatremia  Continue IV insulin drip monitor anion gap closely.  IV fluid as per pulmonary.  On IV ampicillin.  If cultures remain negative should be able to de-escalate to oral antibiotics  Await culture data.  Respiratory panel PCR and MRSA PCR negative.  Replace electrolytes.  Trend creatinine.   elevated CPK noted trending down.  Started on Eliquis as per pulmonary.  Workup for HIT noted  Speech therapy to evaluate patient.  Appreciate input.  Cleared for regular diet  Continue ICU care today as patient remains on insulin drip with lethargy  Diabetic nurse educator and dietitian to see patient when stable.  Appreciate input insulin recommendation noted  Discussed plan with RN.  Discharge home when stable with wife.  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  Eliquis        CODE STATUS:   Code Status (Patient has no pulse and is not breathing): CPR (Attempt to Resuscitate)  Medical Interventions (Patient has pulse or is breathing): Full Support      Part of this note may be an electronic transcription/translation of spoken language to printed text using the Dragon Dictation System.       Electronically signed by Randolph Ramirez MD, 09/30/24, 5:44 PM EDT.  .

## 2024-10-01 LAB
ALBUMIN SERPL-MCNC: 2.8 G/DL (ref 3.5–5.2)
ALBUMIN/GLOB SERPL: 1.2 G/DL
ALP SERPL-CCNC: 106 U/L (ref 39–117)
ALT SERPL W P-5'-P-CCNC: 24 U/L (ref 1–41)
ANION GAP SERPL CALCULATED.3IONS-SCNC: 10.5 MMOL/L (ref 5–15)
ANION GAP SERPL CALCULATED.3IONS-SCNC: 10.5 MMOL/L (ref 5–15)
ANION GAP SERPL CALCULATED.3IONS-SCNC: 9.7 MMOL/L (ref 5–15)
AST SERPL-CCNC: 34 U/L (ref 1–40)
BACTERIA SPEC AEROBE CULT: ABNORMAL
BASOPHILS # BLD AUTO: 0.02 10*3/MM3 (ref 0–0.2)
BASOPHILS NFR BLD AUTO: 0.2 % (ref 0–1.5)
BILIRUB SERPL-MCNC: 0.5 MG/DL (ref 0–1.2)
BUN SERPL-MCNC: 23 MG/DL (ref 6–20)
BUN SERPL-MCNC: 23 MG/DL (ref 6–20)
BUN SERPL-MCNC: 26 MG/DL (ref 6–20)
BUN/CREAT SERPL: 16.1 (ref 7–25)
BUN/CREAT SERPL: 16.1 (ref 7–25)
BUN/CREAT SERPL: 18.3 (ref 7–25)
CALCIUM SPEC-SCNC: 8 MG/DL (ref 8.6–10.5)
CALCIUM SPEC-SCNC: 8 MG/DL (ref 8.6–10.5)
CALCIUM SPEC-SCNC: 8.4 MG/DL (ref 8.6–10.5)
CHLORIDE SERPL-SCNC: 109 MMOL/L (ref 98–107)
CHLORIDE SERPL-SCNC: 109 MMOL/L (ref 98–107)
CHLORIDE SERPL-SCNC: 111 MMOL/L (ref 98–107)
CO2 SERPL-SCNC: 23.5 MMOL/L (ref 22–29)
CO2 SERPL-SCNC: 23.5 MMOL/L (ref 22–29)
CO2 SERPL-SCNC: 24.3 MMOL/L (ref 22–29)
CREAT SERPL-MCNC: 1.42 MG/DL (ref 0.76–1.27)
CREAT SERPL-MCNC: 1.43 MG/DL (ref 0.76–1.27)
CREAT SERPL-MCNC: 1.43 MG/DL (ref 0.76–1.27)
DEPRECATED RDW RBC AUTO: 47.3 FL (ref 37–54)
EGFRCR SERPLBLD CKD-EPI 2021: 59.3 ML/MIN/1.73
EGFRCR SERPLBLD CKD-EPI 2021: 59.3 ML/MIN/1.73
EGFRCR SERPLBLD CKD-EPI 2021: 59.8 ML/MIN/1.73
EOSINOPHIL # BLD AUTO: 0.05 10*3/MM3 (ref 0–0.4)
EOSINOPHIL NFR BLD AUTO: 0.5 % (ref 0.3–6.2)
ERYTHROCYTE [DISTWIDTH] IN BLOOD BY AUTOMATED COUNT: 13.6 % (ref 12.3–15.4)
GLOBULIN UR ELPH-MCNC: 2.4 GM/DL
GLUCOSE BLDC GLUCOMTR-MCNC: 157 MG/DL (ref 70–99)
GLUCOSE BLDC GLUCOMTR-MCNC: 161 MG/DL (ref 70–99)
GLUCOSE BLDC GLUCOMTR-MCNC: 168 MG/DL (ref 70–99)
GLUCOSE BLDC GLUCOMTR-MCNC: 171 MG/DL (ref 70–99)
GLUCOSE BLDC GLUCOMTR-MCNC: 173 MG/DL (ref 70–99)
GLUCOSE BLDC GLUCOMTR-MCNC: 177 MG/DL (ref 70–99)
GLUCOSE BLDC GLUCOMTR-MCNC: 188 MG/DL (ref 70–99)
GLUCOSE BLDC GLUCOMTR-MCNC: 193 MG/DL (ref 70–99)
GLUCOSE BLDC GLUCOMTR-MCNC: 199 MG/DL (ref 70–99)
GLUCOSE BLDC GLUCOMTR-MCNC: 257 MG/DL (ref 70–99)
GLUCOSE BLDC GLUCOMTR-MCNC: 279 MG/DL (ref 70–99)
GLUCOSE SERPL-MCNC: 139 MG/DL (ref 65–99)
GLUCOSE SERPL-MCNC: 163 MG/DL (ref 65–99)
GLUCOSE SERPL-MCNC: 163 MG/DL (ref 65–99)
GRAM STN SPEC: ABNORMAL
HCT VFR BLD AUTO: 35.9 % (ref 37.5–51)
HGB BLD-MCNC: 12 G/DL (ref 13–17.7)
IMM GRANULOCYTES # BLD AUTO: 0.09 10*3/MM3 (ref 0–0.05)
IMM GRANULOCYTES NFR BLD AUTO: 0.9 % (ref 0–0.5)
ISOLATED FROM: ABNORMAL
LARGE PLATELETS: NORMAL
LYMPHOCYTES # BLD AUTO: 2.37 10*3/MM3 (ref 0.7–3.1)
LYMPHOCYTES NFR BLD AUTO: 23.6 % (ref 19.6–45.3)
MAGNESIUM SERPL-MCNC: 2.1 MG/DL (ref 1.6–2.6)
MCH RBC QN AUTO: 31.6 PG (ref 26.6–33)
MCHC RBC AUTO-ENTMCNC: 33.4 G/DL (ref 31.5–35.7)
MCV RBC AUTO: 94.5 FL (ref 79–97)
MONOCYTES # BLD AUTO: 0.87 10*3/MM3 (ref 0.1–0.9)
MONOCYTES NFR BLD AUTO: 8.6 % (ref 5–12)
NEUTROPHILS NFR BLD AUTO: 6.66 10*3/MM3 (ref 1.7–7)
NEUTROPHILS NFR BLD AUTO: 66.2 % (ref 42.7–76)
NRBC BLD AUTO-RTO: 0 /100 WBC (ref 0–0.2)
PHOSPHATE SERPL-MCNC: 2.5 MG/DL (ref 2.5–4.5)
PLATELET # BLD AUTO: 63 10*3/MM3 (ref 140–450)
PMV BLD AUTO: 13.6 FL (ref 6–12)
POTASSIUM SERPL-SCNC: 3.5 MMOL/L (ref 3.5–5.2)
POTASSIUM SERPL-SCNC: 3.5 MMOL/L (ref 3.5–5.2)
POTASSIUM SERPL-SCNC: 3.6 MMOL/L (ref 3.5–5.2)
PROT SERPL-MCNC: 5.2 G/DL (ref 6–8.5)
RBC # BLD AUTO: 3.8 10*6/MM3 (ref 4.14–5.8)
RBC MORPH BLD: NORMAL
SMALL PLATELETS BLD QL SMEAR: NORMAL
SODIUM SERPL-SCNC: 143 MMOL/L (ref 136–145)
SODIUM SERPL-SCNC: 143 MMOL/L (ref 136–145)
SODIUM SERPL-SCNC: 145 MMOL/L (ref 136–145)
WBC MORPH BLD: NORMAL
WBC NRBC COR # BLD AUTO: 10.06 10*3/MM3 (ref 3.4–10.8)

## 2024-10-01 PROCEDURE — 85025 COMPLETE CBC W/AUTO DIFF WBC: CPT | Performed by: INTERNAL MEDICINE

## 2024-10-01 PROCEDURE — 63710000001 INSULIN GLARGINE PER 5 UNITS: Performed by: NURSE PRACTITIONER

## 2024-10-01 PROCEDURE — 82948 REAGENT STRIP/BLOOD GLUCOSE: CPT

## 2024-10-01 PROCEDURE — 63710000001 INSULIN LISPRO (HUMAN) PER 5 UNITS: Performed by: NURSE PRACTITIONER

## 2024-10-01 PROCEDURE — 82948 REAGENT STRIP/BLOOD GLUCOSE: CPT | Performed by: NURSE PRACTITIONER

## 2024-10-01 PROCEDURE — 83735 ASSAY OF MAGNESIUM: CPT | Performed by: INTERNAL MEDICINE

## 2024-10-01 PROCEDURE — 94799 UNLISTED PULMONARY SVC/PX: CPT

## 2024-10-01 PROCEDURE — 25010000002 AMPICILLIN PER 500 MG: Performed by: NURSE PRACTITIONER

## 2024-10-01 PROCEDURE — 0 DEXTROSE 5 % SOLUTION: Performed by: INTERNAL MEDICINE

## 2024-10-01 PROCEDURE — 25010000002 ONDANSETRON PER 1 MG: Performed by: STUDENT IN AN ORGANIZED HEALTH CARE EDUCATION/TRAINING PROGRAM

## 2024-10-01 PROCEDURE — 99291 CRITICAL CARE FIRST HOUR: CPT | Performed by: INTERNAL MEDICINE

## 2024-10-01 PROCEDURE — 99233 SBSQ HOSP IP/OBS HIGH 50: CPT | Performed by: INTERNAL MEDICINE

## 2024-10-01 PROCEDURE — 80053 COMPREHEN METABOLIC PANEL: CPT | Performed by: NURSE PRACTITIONER

## 2024-10-01 PROCEDURE — 85007 BL SMEAR W/DIFF WBC COUNT: CPT | Performed by: INTERNAL MEDICINE

## 2024-10-01 PROCEDURE — 84100 ASSAY OF PHOSPHORUS: CPT | Performed by: INTERNAL MEDICINE

## 2024-10-01 RX ORDER — IBUPROFEN 600 MG/1
1 TABLET ORAL
Status: DISCONTINUED | OUTPATIENT
Start: 2024-10-01 | End: 2024-10-02 | Stop reason: HOSPADM

## 2024-10-01 RX ORDER — INSULIN LISPRO 100 [IU]/ML
2-7 INJECTION, SOLUTION INTRAVENOUS; SUBCUTANEOUS
Status: DISCONTINUED | OUTPATIENT
Start: 2024-10-01 | End: 2024-10-02 | Stop reason: HOSPADM

## 2024-10-01 RX ORDER — LINEZOLID 600 MG/1
600 TABLET, FILM COATED ORAL EVERY 12 HOURS SCHEDULED
Status: DISCONTINUED | OUTPATIENT
Start: 2024-10-01 | End: 2024-10-02 | Stop reason: HOSPADM

## 2024-10-01 RX ORDER — NICOTINE POLACRILEX 4 MG
15 LOZENGE BUCCAL
Status: DISCONTINUED | OUTPATIENT
Start: 2024-10-01 | End: 2024-10-02 | Stop reason: HOSPADM

## 2024-10-01 RX ORDER — POTASSIUM CHLORIDE 750 MG/1
40 CAPSULE, EXTENDED RELEASE ORAL 2 TIMES DAILY WITH MEALS
Status: COMPLETED | OUTPATIENT
Start: 2024-10-01 | End: 2024-10-01

## 2024-10-01 RX ORDER — DEXTROSE MONOHYDRATE 25 G/50ML
25 INJECTION, SOLUTION INTRAVENOUS
Status: DISCONTINUED | OUTPATIENT
Start: 2024-10-01 | End: 2024-10-02 | Stop reason: HOSPADM

## 2024-10-01 RX ADMIN — FAMOTIDINE 20 MG: 20 TABLET ORAL at 18:13

## 2024-10-01 RX ADMIN — AMPICILLIN SODIUM 2 G: 2 INJECTION, POWDER, FOR SOLUTION INTRAVENOUS at 01:10

## 2024-10-01 RX ADMIN — ONDANSETRON 4 MG: 2 INJECTION INTRAMUSCULAR; INTRAVENOUS at 04:12

## 2024-10-01 RX ADMIN — FAMOTIDINE 20 MG: 20 TABLET ORAL at 07:47

## 2024-10-01 RX ADMIN — AMPICILLIN SODIUM 2 G: 2 INJECTION, POWDER, FOR SOLUTION INTRAVENOUS at 13:46

## 2024-10-01 RX ADMIN — INSULIN LISPRO 2 UNITS: 100 INJECTION, SOLUTION INTRAVENOUS; SUBCUTANEOUS at 11:22

## 2024-10-01 RX ADMIN — POTASSIUM CHLORIDE 40 MEQ: 750 CAPSULE, EXTENDED RELEASE ORAL at 07:47

## 2024-10-01 RX ADMIN — AMPICILLIN SODIUM 2 G: 2 INJECTION, POWDER, FOR SOLUTION INTRAVENOUS at 09:21

## 2024-10-01 RX ADMIN — INSULIN LISPRO 4 UNITS: 100 INJECTION, SOLUTION INTRAVENOUS; SUBCUTANEOUS at 18:12

## 2024-10-01 RX ADMIN — DEXTROSE MONOHYDRATE 125 ML/HR: 50 INJECTION, SOLUTION INTRAVENOUS at 06:30

## 2024-10-01 RX ADMIN — AMPICILLIN SODIUM 2 G: 2 INJECTION, POWDER, FOR SOLUTION INTRAVENOUS at 04:12

## 2024-10-01 RX ADMIN — INSULIN GLARGINE 20 UNITS: 100 INJECTION, SOLUTION SUBCUTANEOUS at 09:21

## 2024-10-01 RX ADMIN — INSULIN HUMAN 9.8 UNITS/HR: 1 INJECTION, SOLUTION INTRAVENOUS at 07:54

## 2024-10-01 RX ADMIN — POTASSIUM CHLORIDE 40 MEQ: 750 CAPSULE, EXTENDED RELEASE ORAL at 18:13

## 2024-10-01 RX ADMIN — APIXABAN 10 MG: 5 TABLET, FILM COATED ORAL at 09:21

## 2024-10-01 NOTE — PROGRESS NOTES
[facility]   Hospitalist Progress Note  Date: 10/1/2024  Patient Name: Jaime Calix  : 1975  MRN: [id number]  Date of admission: 2024  Consultants:   -Pulmonology: Dr. Kalyan Freeman, Dr. Oren Molina    Subjective   Subjective     Chief Complaint: Patient found unresponsive    Summary:   Jaime Calix is a 49 y.o. male who presented to the ED after he was found unresponsive in the back of his truck.  Upon EMS arrival patient was unresponsive and cold to the touch.  Patient intubated in the field, hypotensive and hypothermic.  Eval significant for labs showing patient to be in DKA.  Pulmonology consulted to assist in care.  Patient started on insulin drip and IV fluids per protocol.  Empiric antibiotics started.  Blood cultures did return positive for Streptococcus.  Patient did not have poor dentition and no obvious wounds were noted, patient will need to complete 10 to 14 days total of antibiotic therapy.  Additionally patient noted to have thrombocytopenia, his forward Q score was intermediate probability show patient transition to Eliquis and workup for HIT initiated.    Interval Followup:   No acute events overnight.  Patient denied any chest pain or shortness of breath.  Still answered at this morning.  Patient's appetite has improved.  Denies chest pain or shortness of breath.  Nursing with no additional acute issues to report.    Antibiotics:   -Ampicillin    Objective   Objective     Vitals:   Temp:  [98 °F (36.7 °C)-98.4 °F (36.9 °C)] 98.2 °F (36.8 °C)  Heart Rate:  [51-83] 71  BP: (101-158)/(57-97) 101/57  Physical Exam   Gen: No acute distress, Conversant, Pleasant, lying in bed  Resp: CTAB, No w/r/r, No respiratory distress appreciated  Card: RRR, No m/r/g  Abd: Soft, Nontender, Nondistended, + bowel sounds    Result Review    Result Review:  I have personally reviewed the results as below and agree with these findings:  []  Laboratory:   CMP          2024    03:10 2024     12:07 9/29/2024    18:18 9/30/2024    00:25 9/30/2024    05:59 9/30/2024    11:59 9/30/2024    17:58 10/1/2024    00:44 10/1/2024    06:01   CMP   Glucose 395  422  169  188  113  165  125  139  163     163    BUN 50  52  46  40  38  32  28  26  23     23    Creatinine 1.97  2.09  1.88  1.66  1.73  1.67  1.48  1.42  1.43     1.43    EGFR 40.4  37.6  42.7  49.6  47.2  49.2  56.9  59.8  59.3     59.3    Sodium 149  151  156  154  154  150  147  145  143     143    Potassium 4.0  4.2  3.7  2.7  3.1  3.1  3.2  3.6  3.5     3.5    Chloride 112  114  121  118  118  114  113  111  109     109    Calcium 8.4  8.3  8.7  8.3  8.6  8.4  8.4  8.4  8.0     8.0    Total Protein         5.2    Albumin         2.8    Globulin         2.4    Total Bilirubin         0.5    Alkaline Phosphatase         106    AST (SGOT)         34    ALT (SGPT)         24    Albumin/Globulin Ratio         1.2    BUN/Creatinine Ratio 25.4  24.9  24.5  24.1  22.0  19.2  18.9  18.3  16.1     16.1    Anion Gap 19.0  20.6  11.5  10.3  9.4  12.0  10.2  9.7  10.5     10.5      CBC          9/29/2024    04:31 9/30/2024    05:59 10/1/2024    06:01   CBC   WBC 10.06  9.85  10.06    RBC 3.78  3.89  3.80    Hemoglobin 12.3  12.8  12.0    Hematocrit 33.8  35.9  35.9    MCV 89.4  92.3  94.5    MCH 32.5  32.9  31.6    MCHC 36.4  35.7  33.4    RDW 12.2  13.5  13.6    Platelets 87  72  63    Phosphorus and magnesium within normal limits  [x]  Microbiology: Blood culture (09/29/2024): Streptococcus mitis/oralis and Streptococcus sanguinous  []  Radiology:   [x]  EKG/Telemetry:    []  Cardiology/Vascular:    []  Pathology:  []  Old records:  []  Other:    Assessment & Plan   Assessment / Plan     Assessment:  Streptococcus bacteremia  Newly diagnosed type 2 diabetes mellitus  DKA associated with type 2 diabetes mellitus with coma  Septic shock due to Streptococcus bacteremia, present on  admission  Hypothermia  Hypophosphatemia/hypokalemia  Hyponatremia  Thrombocytopenia    Plan:  -Pulmonology consulted and following, appreciate assistance and recommendations in the care of this patient.  -Transition patient to Lantus and SSI.  Monitor blood glucose level and SSI requirement and titrate insulin regimen accordingly  -Stop IV fluids.  Encouraged oral intake.  -Continue ampicillin.  Repeat cultures negative to date.  Patient will need 10 to 14 days total of antibiotic therapy.  -HIT panel ordered per pulmonology  -Replace potassium  -Transfer out of ICU  -Will monitor electrolytes and renal function with BMP and magnesium level in the AM  -Will monitor WBC and Hgb with CBC in the AM  -Clinical course will dictate further management     DVT Prophylaxis: Eliquis  GI Prophylaxis: Famotidine  Diet:   Diet Order   Procedures    Diet: Diabetic; Consistent Carbohydrate; Fluid Consistency: Thin (IDDSI 0)     Dispo: Transfer out of ICU     Time spent personally reviewing patient's chart, labs and imaging, evaluating/examining the patient, discussing care plan with patient and nurse at bedside and discussing management with consultants (pulmonology): 51 minutes.     Part of this note may be an electronic transcription/translation of spoken language to printed text using the Dragon dictation system.    VTE Prophylaxis:  Pharmacologic & mechanical VTE prophylaxis orders are present.        CODE STATUS:   Code Status (Patient has no pulse and is not breathing): CPR (Attempt to Resuscitate)  Medical Interventions (Patient has pulse or is breathing): Full Support        Electronically signed by Fidel Darnell MD, 10/1/2024, 13:59 EDT.

## 2024-10-01 NOTE — PROGRESS NOTES
[facility]     Progress Note    Patient Name: Jaime Calix  : 1975  MRN: [id number]  Primary Care Physician:  Provider, No Known  Date of admission: 2024    Subjective   Subjective     Patient is critically ill in the ICU with septic shock, strep bacteremia, DKA    On D5W infusion and sodium improved  Remains on insulin drip.  Still requiring over 7 units of insulin  Sitting on side of bed  Appetite improved  Still weak and fatigued  No respiratory complaints  Repeat blood cultures were negative.  Admission blood cultures positive for micrococcus  Platelets are trending down  Not complaining of any dental pain and not complaining of any wounds    Objective   Objective     Vitals:   Temp:  [98 °F (36.7 °C)-98.4 °F (36.9 °C)] 98.2 °F (36.8 °C)  Heart Rate:  [51-83] 71  BP: (101-158)/(57-97) 101/57    Vital Signs Reviewed  Critically ill male, more awake and alert  HEENT:  PERRL, EOMI.  OP, nares clear, no sinus tenderness  Chest:  good aeration, clear to auscultation bilaterally, tympanic to percussion bilaterally, no work of breathing noted  CV: RRR, no MGR, pulses 2+, equal.  Abd:  Soft, NT, ND, + BS, no HSM  EXT:  no clubbing, no cyanosis, no edema  Neuro:  A&Ox3, CN grossly intact, no focal deficits.  Skin: No rashes or lesions noted      Result Review    Result Review:  I have personally reviewed the results from the time of this admission to 10/1/2024 13:21 EDT and agree with these findings:  [x]  Laboratory list / accordion  [x]  Microbiology  [x]  Radiology  [x]  EKG/Telemetry   [x]  Cardiology/Vascular   []  Pathology  []  Old records  []  Other:  Most notable findings include:       Lab 10/01/24  0601 10/01/24  0044 24  1758 24  1159 24  0559 24  0025 24  1818 24  1207 24  0431 24  0310 24  1449 24  1208 24  0804 24  0523 24  0409 24  1931 24  1600 24  1354 24  1349   WBC 10.06  --   --    --  9.85  --   --   --  10.06  --   --   --   --   --  12.93*  --  29.30*  --  33.61*   HEMOGLOBIN 12.0*  --   --   --  12.8*  --   --   --  12.3*  --   --   --   --   --  14.1  --  12.3*  --  15.2   HEMATOCRIT 35.9*  --   --   --  35.9*  --   --   --  33.8*  --   --   --   --   --  40.0  --  37.4*  --  47.8   PLATELETS 63*  --   --   --  72*  --   --   --  87*  --   --   --   --   --  112*  --  135*  --  189   SODIUM 143  143 145 147* 150* 154* 154* 156*   < >  --  149*  --  147* 147*  --  147*   < > 143  --  135*   POTASSIUM 3.5  3.5 3.6 3.2* 3.1* 3.1* 2.7* 3.7   < >  --  4.0   < > 3.6 3.4*  --  3.2*   < > 3.6  --  5.3*   CHLORIDE 109*  109* 111* 113* 114* 118* 118* 121*   < >  --  112*  --  112* 112*  --  107   < > 117*  --  94*   CO2 23.5  23.5 24.3 23.8 24.0 26.6 25.7 23.5   < >  --  18.0*  --  21.5* 25.9  --  18.2*   < > 5.4*  --  4.7*   BUN 23*  23* 26* 28* 32* 38* 40* 46*   < >  --  50*  --  48* 51*  --  56*   < > 37*  --  55*   CREATININE 1.43*  1.43* 1.42* 1.48* 1.67* 1.73* 1.66* 1.88*   < >  --  1.97*  --  1.91* 2.01*  --  2.13*   < > 1.27   < > 2.44*   GLUCOSE 163*  163* 139* 125* 165* 113* 188* 169*   < >  --  395*  --  135* 183*  --  434*   < > 558*  --  954*   CALCIUM 8.0*  8.0* 8.4* 8.4* 8.4* 8.6 8.3* 8.7   < >  --  8.4*  --  8.2* 8.1*  --  8.6   < > 4.4*  --  8.8   PHOSPHORUS 2.5  --   --   --  1.8*  --   --   --   --  2.1*  --  0.6* 0.8* 0.3* <0.3*   < > 5.4*  --  11.2*   TOTAL PROTEIN 5.2*  --   --   --   --   --   --   --   --   --   --   --   --   --   --   --   --   --  6.4   ALBUMIN 2.8*  --   --   --   --   --   --   --   --   --   --   --   --   --   --   --   --   --  3.5   GLOBULIN 2.4  --   --   --   --   --   --   --   --   --   --   --   --   --   --   --   --   --  2.9    < > = values in this interval not displayed.     Results for orders placed during the hospital encounter of 09/27/24    Adult Transthoracic Echo Complete W/ Cont if Necessary Per Protocol    Interpretation  Summary    Left ventricular ejection fraction appears to be 56 - 60%.    Left ventricular diastolic function was normal.    Blood cultures with alphahemolytic strep species        Assessment & Plan   Assessment / Plan       Active Hospital Problems:  Active Hospital Problems    Diagnosis     **DKA (diabetic ketoacidosis)     Metabolic acidosis     Hypothermia     Hypoxic respiratory failure     Leukocytosis     Hyperphosphatemia     CHARI (acute kidney injury)     Metabolic encephalopathy    Micrococcus bacteremia  Newly diagnosed diabetes, type II.  A1c greater than 13  Diabetic ketoacidosis with coma  Septic shock, present on admission  Hypothermia  Hypophosphatemia  Hypokalemia  Hypernatremia  Dehydration  Thrombocytopenia    Plan:   Transition insulin drip over to Lantus plus sliding scale insulin.  He will need insulin at discharge.  Will need diabetic educator to assist with education.  Appreciate assistance  Sodium improved.  Discontinue IV fluids.  Encourage oral intake  Trend renal panel and electrolytes.  Replace potassium orally  Question if this is patient's baseline creatinine  Continue ampicillin for micrococcus bacteremia.  Repeat blood cultures negative.  Will likely need 10 to 14 days of therapy and will de-escalate accordingly  Given blood cultures have cleared, he has adequate dentition and no obvious wounds, will hold off on pursuing further workup for source of infection  Monitor CBC.  Platelets have slowly been trending down since admission.  Has never been hospitalized or exposed to subcutaneous heparin until this admission.  His 4T score is intermediate probability for heparin-induced thrombocytopenia.  Continue Eliquis for now.  Pending results of HIT panel with RUTH   Will need diabetic education.  Will likely need to go home with insulin at discharge    Okay to transfer out of ICU    Peptic ulcer prophylaxis Pepcid  VTE Prophylaxis: Subcutaneous heparin  Pharmacologic & mechanical VTE  prophylaxis orders are present.    CODE STATUS:    Code Status (Patient has no pulse and is not breathing): CPR (Attempt to Resuscitate)  Medical Interventions (Patient has pulse or is breathing): Full Support      The patient is critically ill in the ICU with septic shock, streptococcal bacteremia, multiple electrolyte disturbances, dehydration, DKA without coma, new diagnosis of diabetes. Bedside rounds were performed with nursing staff, respiratory therapy, pharmacy, nutritional services, social work. I have personally reviewed the chart, labs and any pertinent imaging available.  I have spent 30 minutes of critical care time, excluding procedures, in the care of this patient.    Electronically signed by Oren Molina MD, 10/01/24, 1:23 PM EDT.

## 2024-10-01 NOTE — PLAN OF CARE
Goal Outcome Evaluation:           Progress: improving  Outcome Evaluation: AxOx4. Zofran x1. Insuling gtt.

## 2024-10-01 NOTE — PLAN OF CARE
Goal Outcome Evaluation:   Patient is progressing. A&Ox4, up to bathroom or bedside commode ad todd. Wife and mother at bedside. No complaints at this time, call light in reach.

## 2024-10-02 ENCOUNTER — READMISSION MANAGEMENT (OUTPATIENT)
Dept: CALL CENTER | Facility: HOSPITAL | Age: 49
End: 2024-10-02
Payer: COMMERCIAL

## 2024-10-02 VITALS
DIASTOLIC BLOOD PRESSURE: 88 MMHG | HEIGHT: 72 IN | SYSTOLIC BLOOD PRESSURE: 151 MMHG | BODY MASS INDEX: 33.2 KG/M2 | RESPIRATION RATE: 12 BRPM | HEART RATE: 65 BPM | TEMPERATURE: 98.2 F | OXYGEN SATURATION: 98 % | WEIGHT: 245.15 LBS

## 2024-10-02 LAB
ALBUMIN SERPL-MCNC: 2.6 G/DL (ref 3.5–5.2)
ALBUMIN/GLOB SERPL: 1 G/DL
ALP SERPL-CCNC: 107 U/L (ref 39–117)
ALT SERPL W P-5'-P-CCNC: 21 U/L (ref 1–41)
ANION GAP SERPL CALCULATED.3IONS-SCNC: 9.2 MMOL/L (ref 5–15)
AST SERPL-CCNC: 23 U/L (ref 1–40)
BASOPHILS # BLD AUTO: 0.03 10*3/MM3 (ref 0–0.2)
BASOPHILS NFR BLD AUTO: 0.3 % (ref 0–1.5)
BILIRUB SERPL-MCNC: 0.5 MG/DL (ref 0–1.2)
BUN SERPL-MCNC: 20 MG/DL (ref 6–20)
BUN/CREAT SERPL: 12.8 (ref 7–25)
CALCIUM SPEC-SCNC: 8.3 MG/DL (ref 8.6–10.5)
CHLORIDE SERPL-SCNC: 108 MMOL/L (ref 98–107)
CO2 SERPL-SCNC: 21.8 MMOL/L (ref 22–29)
CREAT SERPL-MCNC: 1.56 MG/DL (ref 0.76–1.27)
DEPRECATED RDW RBC AUTO: 46.2 FL (ref 37–54)
EGFRCR SERPLBLD CKD-EPI 2021: 53.4 ML/MIN/1.73
EOSINOPHIL # BLD AUTO: 0.06 10*3/MM3 (ref 0–0.4)
EOSINOPHIL NFR BLD AUTO: 0.6 % (ref 0.3–6.2)
ERYTHROCYTE [DISTWIDTH] IN BLOOD BY AUTOMATED COUNT: 13.2 % (ref 12.3–15.4)
GLOBULIN UR ELPH-MCNC: 2.5 GM/DL
GLUCOSE BLDC GLUCOMTR-MCNC: 244 MG/DL (ref 70–99)
GLUCOSE BLDC GLUCOMTR-MCNC: 244 MG/DL (ref 70–99)
GLUCOSE SERPL-MCNC: 242 MG/DL (ref 65–99)
HCT VFR BLD AUTO: 34.5 % (ref 37.5–51)
HGB BLD-MCNC: 11.6 G/DL (ref 13–17.7)
IMM GRANULOCYTES # BLD AUTO: 0.04 10*3/MM3 (ref 0–0.05)
IMM GRANULOCYTES NFR BLD AUTO: 0.4 % (ref 0–0.5)
LYMPHOCYTES # BLD AUTO: 1.89 10*3/MM3 (ref 0.7–3.1)
LYMPHOCYTES NFR BLD AUTO: 20.1 % (ref 19.6–45.3)
MAGNESIUM SERPL-MCNC: 2.1 MG/DL (ref 1.6–2.6)
MCH RBC QN AUTO: 32 PG (ref 26.6–33)
MCHC RBC AUTO-ENTMCNC: 33.6 G/DL (ref 31.5–35.7)
MCV RBC AUTO: 95.3 FL (ref 79–97)
MONOCYTES # BLD AUTO: 1.12 10*3/MM3 (ref 0.1–0.9)
MONOCYTES NFR BLD AUTO: 11.9 % (ref 5–12)
NEUTROPHILS NFR BLD AUTO: 6.27 10*3/MM3 (ref 1.7–7)
NEUTROPHILS NFR BLD AUTO: 66.7 % (ref 42.7–76)
NRBC BLD AUTO-RTO: 0 /100 WBC (ref 0–0.2)
PF4 HEPARIN CMPLX IGG SERPL IA: 0.04 OD (ref 0–0.4)
PHOSPHATE SERPL-MCNC: 2.8 MG/DL (ref 2.5–4.5)
PLATELET # BLD AUTO: 79 10*3/MM3 (ref 140–450)
PMV BLD AUTO: 13 FL (ref 6–12)
POTASSIUM SERPL-SCNC: 4.1 MMOL/L (ref 3.5–5.2)
PROT SERPL-MCNC: 5.1 G/DL (ref 6–8.5)
RBC # BLD AUTO: 3.62 10*6/MM3 (ref 4.14–5.8)
SODIUM SERPL-SCNC: 139 MMOL/L (ref 136–145)
WBC NRBC COR # BLD AUTO: 9.41 10*3/MM3 (ref 3.4–10.8)

## 2024-10-02 PROCEDURE — 85025 COMPLETE CBC W/AUTO DIFF WBC: CPT | Performed by: INTERNAL MEDICINE

## 2024-10-02 PROCEDURE — 63710000001 INSULIN GLARGINE PER 5 UNITS: Performed by: NURSE PRACTITIONER

## 2024-10-02 PROCEDURE — 82948 REAGENT STRIP/BLOOD GLUCOSE: CPT

## 2024-10-02 PROCEDURE — 84100 ASSAY OF PHOSPHORUS: CPT | Performed by: INTERNAL MEDICINE

## 2024-10-02 PROCEDURE — 63710000001 INSULIN GLARGINE PER 5 UNITS: Performed by: INTERNAL MEDICINE

## 2024-10-02 PROCEDURE — 94799 UNLISTED PULMONARY SVC/PX: CPT

## 2024-10-02 PROCEDURE — 80053 COMPREHEN METABOLIC PANEL: CPT | Performed by: INTERNAL MEDICINE

## 2024-10-02 PROCEDURE — 63710000001 INSULIN LISPRO (HUMAN) PER 5 UNITS: Performed by: NURSE PRACTITIONER

## 2024-10-02 PROCEDURE — 83735 ASSAY OF MAGNESIUM: CPT | Performed by: INTERNAL MEDICINE

## 2024-10-02 PROCEDURE — 99239 HOSP IP/OBS DSCHRG MGMT >30: CPT | Performed by: INTERNAL MEDICINE

## 2024-10-02 PROCEDURE — 82948 REAGENT STRIP/BLOOD GLUCOSE: CPT | Performed by: NURSE PRACTITIONER

## 2024-10-02 RX ORDER — LISINOPRIL 10 MG/1
10 TABLET ORAL DAILY
Status: DISCONTINUED | OUTPATIENT
Start: 2024-10-02 | End: 2024-10-02 | Stop reason: HOSPADM

## 2024-10-02 RX ORDER — BLOOD-GLUCOSE METER
1 EACH MISCELLANEOUS
Qty: 1 KIT | Refills: 0 | Status: SHIPPED | OUTPATIENT
Start: 2024-10-02 | End: 2025-10-02

## 2024-10-02 RX ORDER — GLUCOSAMINE HCL/CHONDROITIN SU 500-400 MG
1 CAPSULE ORAL
Qty: 150 EACH | Refills: 0 | Status: SHIPPED | OUTPATIENT
Start: 2024-10-02 | End: 2024-11-01

## 2024-10-02 RX ORDER — LISINOPRIL 10 MG/1
10 TABLET ORAL DAILY
Qty: 30 TABLET | Refills: 0 | Status: SHIPPED | OUTPATIENT
Start: 2024-10-03 | End: 2024-11-02

## 2024-10-02 RX ORDER — INSULIN LISPRO 100 [IU]/ML
INJECTION, SOLUTION INTRAVENOUS; SUBCUTANEOUS
Qty: 15 ML | Refills: 0 | Status: SHIPPED | OUTPATIENT
Start: 2024-10-02

## 2024-10-02 RX ORDER — BLOOD SUGAR DIAGNOSTIC
1 STRIP MISCELLANEOUS
Qty: 150 EACH | Refills: 0 | Status: SHIPPED | OUTPATIENT
Start: 2024-10-02 | End: 2024-11-01

## 2024-10-02 RX ORDER — LANCETS 33 GAUGE
1 EACH MISCELLANEOUS DAILY
Qty: 100 EACH | Refills: 0 | Status: SHIPPED | OUTPATIENT
Start: 2024-10-02 | End: 2024-11-01

## 2024-10-02 RX ORDER — LINEZOLID 600 MG/1
600 TABLET, FILM COATED ORAL EVERY 12 HOURS SCHEDULED
Qty: 8 TABLET | Refills: 0 | Status: SHIPPED | OUTPATIENT
Start: 2024-10-02 | End: 2024-10-06

## 2024-10-02 RX ADMIN — Medication 10 ML: at 08:21

## 2024-10-02 RX ADMIN — INSULIN LISPRO 3 UNITS: 100 INJECTION, SOLUTION INTRAVENOUS; SUBCUTANEOUS at 12:09

## 2024-10-02 RX ADMIN — APIXABAN 10 MG: 5 TABLET, FILM COATED ORAL at 08:20

## 2024-10-02 RX ADMIN — INSULIN LISPRO 3 UNITS: 100 INJECTION, SOLUTION INTRAVENOUS; SUBCUTANEOUS at 08:21

## 2024-10-02 RX ADMIN — LISINOPRIL 10 MG: 10 TABLET ORAL at 08:20

## 2024-10-02 RX ADMIN — LINEZOLID 600 MG: 600 TABLET, FILM COATED ORAL at 08:21

## 2024-10-02 RX ADMIN — INSULIN GLARGINE 25 UNITS: 100 INJECTION, SOLUTION SUBCUTANEOUS at 08:21

## 2024-10-02 RX ADMIN — FAMOTIDINE 20 MG: 20 TABLET ORAL at 07:17

## 2024-10-02 RX ADMIN — Medication 10 ML: at 00:53

## 2024-10-02 RX ADMIN — INSULIN LISPRO 4 UNITS: 100 INJECTION, SOLUTION INTRAVENOUS; SUBCUTANEOUS at 00:53

## 2024-10-02 RX ADMIN — SENNOSIDES AND DOCUSATE SODIUM 2 TABLET: 50; 8.6 TABLET ORAL at 08:20

## 2024-10-02 RX ADMIN — SENNOSIDES AND DOCUSATE SODIUM 2 TABLET: 50; 8.6 TABLET ORAL at 00:52

## 2024-10-02 RX ADMIN — INSULIN GLARGINE 20 UNITS: 100 INJECTION, SOLUTION SUBCUTANEOUS at 00:53

## 2024-10-02 RX ADMIN — APIXABAN 10 MG: 5 TABLET, FILM COATED ORAL at 00:52

## 2024-10-02 RX ADMIN — LINEZOLID 600 MG: 600 TABLET, FILM COATED ORAL at 00:52

## 2024-10-02 NOTE — PLAN OF CARE
Goal Outcome Evaluation:           Progress: improving  Outcome Evaluation: A&Ox4.  No complaints of pain this shift.  No acute distress noted.  Slept throughout shift wearing CPAP without complications.  VSS. Pt states that he is feeling much better.  Call bell within reach at all times for needs and safety.

## 2024-10-02 NOTE — DISCHARGE SUMMARY
[facility]         HOSPITALIST  DISCHARGE SUMMARY    Patient Name: Jaime Calix  : 1975  MRN: [id number]    Date of Admission: 2024  Date of Discharge:  10/02/24  Primary Care Physician: Provider, No Known    Consultants:  -Pulmonology: Dr. Kalyan Freeman, Dr. Oren Molina     [facility] Problems:  Streptococcus bacteremia  Newly diagnosed type 2 diabetes mellitus  DKA associated with type 2 diabetes mellitus with coma  Septic shock due to Streptococcus bacteremia, present on admission  Hypothermia  Hypophosphatemia/hypokalemia  Hyponatremia  Thrombocytopenia    Hospital Course     Hospital Course:  Jaime Calix is a 49 y.o. male who presented to the ED after he was found unresponsive in the back of his truck. Upon EMS arrival patient was unresponsive and cold to the touch. Patient intubated in the field, hypotensive and hypothermic. Eval significant for labs showing patient to be in DKA. Pulmonology consulted to assist in care. Patient started on insulin drip and IV fluids per protocol. Empiric antibiotics started. Blood cultures did return positive for Streptococcus. Patient did not have poor dentition and no obvious wounds were noted, patient will complete 10 to 14 days total of antibiotic therapy. Additionally patient noted to have thrombocytopenia, his 4T score was intermediate probability show patient transition to Eliquis and workup for HIT initiated, will still pending at time of discharge so decision made to discharge patient on apixaban pending results.  On day of discharge patient hemodynamically stable and no additional inpatient evaluation or admission at this time, patient discharged home and is to establish care with PCP in [place] for follow-up.    DISCHARGE Follow Up Recommendations for labs and diagnostics:   -Follow-up with PCP in 3 to 5 days    Day of Discharge     Vital Signs:  Temp:  [97.9 °F (36.6 °C)-98.8 °F (37.1 °C)] 97.9 °F (36.6 °C)  Heart Rate:   [57-77] 58  Resp:  [12-20] 12  BP: (101-160)/(57-99) 151/80  Physical Exam:   Gen: No acute distress, sitting up in bed eating breakfast, pleasant  Resp: CTAB, No w/r/r, equal chest rise bilaterally  Card: RRR, No m/r/g  Abd: Soft, Nontender, Nondistended, + bowel sounds     Discharge Details        Discharge Medications        New Medications        Instructions Start Date   Accu-Chek Guide test strip  Generic drug: glucose blood   1 each, Other, 5 Times Daily, Use as instructed      Accu-Chek Guide w/Device kit   1 each, Does not apply, Every 3 Years      Alcohol Swabs 70 % pads   1 each, Does not apply, 5 Times Daily      apixaban 5 MG tablet tablet  Commonly known as: ELIQUIS   5 mg, Oral, 2 Times Daily      Insulin Glargine 100 UNIT/ML injection pen  Commonly known as: LANTUS SOLOSTAR   25 Units, Subcutaneous, 2 Times Daily      Insulin Lispro (1 Unit Dial) 100 UNIT/ML solution pen-injector  Commonly known as: HumaLOG KwikPen   2-7 units, subcutaneous, 3 times daily before meals. Blood Glucose 150-199 mg/dL-2 units.Blood Glucose 200-249 mg/dL-3 units.Blood Glucose 250-299 mg/dL-4 units.Blood Glucose 300-349 mg/dL-5 units.Blood Glucose 350-400 mg/dL-6 units.Blood Glucose Greater Than 400 mg/dL-7 units. Max total daily dose: 21 units.      Insulin Pen Needle 32G X 4 MM misc   1 each, Does not apply, 5 Times Daily      Lancets 33G misc   1 each, Does not apply, Daily      linezolid 600 MG tablet  Commonly known as: ZYVOX   600 mg, Oral, Every 12 Hours Scheduled      lisinopril 10 MG tablet  Commonly known as: PRINIVIL,ZESTRIL   10 mg, Oral, Daily   Start Date: October 3, 2024              No Known Allergies    Discharge Disposition:  Home or Self Care    Diet:  Hospital:  Diet Order   Procedures    Diet: Diabetic; Consistent Carbohydrate; Fluid Consistency: Thin (IDDSI 0)       Discharge Activity:   Activity Instructions       Activity as Tolerated              CODE STATUS:  Code Status and Medical Interventions:  CPR (Attempt to Resuscitate); Full Support   Ordered at: 09/27/24 1558     Code Status (Patient has no pulse and is not breathing):    CPR (Attempt to Resuscitate)     Medical Interventions (Patient has pulse or is breathing):    Full Support       No future appointments.    Additional Instructions for the Follow-ups that You Need to Schedule       Discharge Follow-up with PCP   As directed       Currently Documented PCP:    Provider, No Known    PCP Phone Number:    None     Follow Up Details: Follow-up in 3-5 days                Pertinent  and/or Most Recent Results     RADIOLOGY:  CT Head Without Contrast [[id number]] Felix as Reviewed   Order Status: Completed Collected: 09/27/24 1643    Updated: 09/27/24 1646   Narrative:     CT HEAD WO CONTRAST    Date of Exam: 9/27/2024 4:28 PM EDT    Indication: unresponsive.    Comparison: None available.    Technique: Axial CT images were obtained of the head without contrast administration.  Reconstructed coronal and sagittal images were also obtained. Automated exposure control and iterative construction methods were used.      Findings:  There is no evidence of acute intracranial hemorrhage, mass, midline shift, loss of gray-white matter differentiation, or extra-axial fluid collection.    There is normal ventricular volume. The basal cisterns are patent.    Normal density of the dural venous sinuses.    No evidence of fracture.    The paranasal sinuses are predominantly well aerated. Partial right mastoid air cell effusion.    The orbits and included soft tissues show no acute abnormality.      Impression:     Impression:  No acute intracranial abnormality.        Electronically Signed: Russ Moore MD   9/27/2024 4:44 PM EDT   Workstation ID: [id number]    XR Chest 1 View [[id number]] Felix as Reviewed   Order Status: Completed Collected: 09/29/24 1040    Updated: 09/29/24 1044   Narrative:     XR CHEST 1 VW-     Date of Exam: 9/27/2024 1:57 PM     Indication: SOA Triage  Protocol     Comparison: None available     Findings:  Heart is normal in size. Low lung volumes are evident, with subsegmental  atelectasis at the left lung base.     Endotracheal tube tip is 7 cm above the stephania.     Bony structures appear intact.      Impression:     Impression:  Low lung volumes with subsegmental atelectasis at the left lung base.     Endotracheal tube tip is 7 cm above the stephania.        Electronically Signed By-CARLOTA QUINONES MD On:9/29/2024 10:42 AM     LAB RESULTS:      Lab 10/02/24  0423 10/01/24  0601 09/30/24  0559 09/29/24  0431 09/28/24  2104 09/28/24  1449 09/28/24  1208 09/28/24  0804 09/28/24  0635 09/28/24  0409 09/27/24  1935 09/27/24  1600 09/27/24  1354 09/27/24  1349   WBC 9.41 10.06 9.85 10.06  --   --   --   --   --  12.93*  --  29.30*  --  33.61*   HEMOGLOBIN 11.6* 12.0* 12.8* 12.3*  --   --   --   --   --  14.1  --  12.3*  --  15.2   HEMATOCRIT 34.5* 35.9* 35.9* 33.8*  --   --   --   --   --  40.0  --  37.4*  --  47.8   PLATELETS 79* 63* 72* 87*  --   --   --   --   --  112*  --  135*  --  189   NEUTROS ABS 6.27 6.66  --   --   --   --   --   --   --   --   --  24.54*  --  29.91*   IMMATURE GRANS (ABS) 0.04 0.09*  --   --   --   --   --   --   --   --   --  1.41*  --   --    LYMPHS ABS 1.89 2.37  --   --   --   --   --   --   --   --   --  1.90  --   --    MONOS ABS 1.12* 0.87  --   --   --   --   --   --   --   --   --  1.34*  --   --    EOS ABS 0.06 0.05  --   --   --   --   --   --   --   --   --  0.01  --  0.34   MCV 95.3 94.5 92.3 89.4  --   --   --   --   --  91.7  --  100.5*  --  101.5*   PROCALCITONIN  --   --   --   --   --   --   --   --   --   --   --  0.74*  --   --    LACTATE  --   --   --   --  1.9 2.4* 3.5* 3.0* 3.2*  --    < >  --    < >  --     < > = values in this interval not displayed.         Lab 10/02/24  0423 10/01/24  0601 10/01/24  0044 09/30/24  1758 09/30/24  1159 09/30/24  0559 09/29/24  1207 09/29/24  0310 09/28/24  1449 09/28/24  1208  09/28/24  0804 09/27/24  1354 09/27/24  1349   SODIUM 139 143  143 145 147* 150* 154*   < > 149*  --  147* 147*   < > 135*   POTASSIUM 4.1 3.5  3.5 3.6 3.2* 3.1* 3.1*   < > 4.0   < > 3.6 3.4*   < > 5.3*   CHLORIDE 108* 109*  109* 111* 113* 114* 118*   < > 112*  --  112* 112*   < > 94*   CO2 21.8* 23.5  23.5 24.3 23.8 24.0 26.6   < > 18.0*  --  21.5* 25.9   < > 4.7*   ANION GAP 9.2 10.5  10.5 9.7 10.2 12.0 9.4   < > 19.0*  --  13.5 9.1   < > 36.3*   BUN 20 23*  23* 26* 28* 32* 38*   < > 50*  --  48* 51*   < > 55*   CREATININE 1.56* 1.43*  1.43* 1.42* 1.48* 1.67* 1.73*   < > 1.97*  --  1.91* 2.01*   < > 2.44*   EGFR 53.4* 59.3*  59.3* 59.8* 56.9* 49.2* 47.2*   < > 40.4*  --  41.9* 39.4*   < > 31.2*   GLUCOSE 242* 163*  163* 139* 125* 165* 113*   < > 395*  --  135* 183*   < > 954*   CALCIUM 8.3* 8.0*  8.0* 8.4* 8.4* 8.4* 8.6   < > 8.4*  --  8.2* 8.1*   < > 8.8   MAGNESIUM 2.1 2.1  --   --   --  2.6  --  2.7*  --  2.8* 2.8*   < > 3.5*   PHOSPHORUS 2.8 2.5  --   --   --  1.8*  --  2.1*  --  0.6* 0.8*   < > 11.2*   HEMOGLOBIN A1C  --   --   --   --   --   --   --   --   --   --   --   --  13.90*   TSH  --   --   --   --   --   --   --   --   --   --  0.649  --   --     < > = values in this interval not displayed.         Lab 10/02/24  0423 10/01/24  0601 09/29/24  0431 09/27/24  1349   TOTAL PROTEIN 5.1* 5.2*  --  6.4   ALBUMIN 2.6* 2.8*  --  3.5   GLOBULIN 2.5 2.4  --  2.9   ALT (SGPT) 21 24  --  20   AST (SGOT) 23 34  --  17   BILIRUBIN 0.5 0.5  --  0.3   ALK PHOS 107 106  --  199*   LIPASE  --   --  132*  --          Lab 09/27/24  1349   PROBNP 99.4   HSTROP T 23*                 Lab 09/28/24  0635 09/28/24  0007 09/27/24  1935   PH, ARTERIAL 7.415 7.263* 6.870*   PCO2, ARTERIAL 36.8 31.4* 46.7*   PO2 ART 73.7* 98.2 125.4*   O2 SATURATION ART 94.9* 96.7 94.5*   FIO2 30 30 30   HCO3 ART 23.6 14.2* 8.5*   BASE EXCESS ART -0.7 -11.5* -25.2*     Brief Urine Lab Results  (Last result in the past 365 days)         Color   Clarity   Blood   Leuk Est   Nitrite   Protein   CREAT   Urine HCG        09/27/24 2234 Yellow   Cloudy   Large (3+)   Negative   Negative   30 mg/dL (1+)                 Microbiology Results (last 10 days)       Procedure Component Value - Date/Time    Blood Culture - Blood, Arm, Left [[id number]]  (Normal) Collected: 09/28/24 1449    Lab Status: Preliminary result Specimen: Blood from Arm, Left Updated: 10/01/24 1500     Blood Culture No growth at 3 days    Blood Culture - Blood, Arm, Left [[id number]]  (Normal) Collected: 09/28/24 1208    Lab Status: Preliminary result Specimen: Blood from Arm, Left Updated: 10/01/24 1215     Blood Culture No growth at 3 days    S. Pneumo Ag Urine or CSF - Urine, Urine, Clean Catch [[id number]]  (Normal) Collected: 09/27/24 2234    Lab Status: Final result Specimen: Urine, Clean Catch Updated: 09/28/24 0738     Strep Pneumo Ag Negative    Legionella Antigen, Urine - Urine, Urine, Clean Catch [[id number]]  (Normal) Collected: 09/27/24 2234    Lab Status: Final result Specimen: Urine, Clean Catch Updated: 09/28/24 0737     LEGIONELLA ANTIGEN, URINE Negative    Respiratory Panel PCR w/COVID-19(SARS-CoV-2) LETITIA/QUITA/ERIC/PAD/COR/JUAN CARLOS In-House, NP Swab in UTM/VTM, 2 HR TAT - Swab, Nasopharynx [[id number]]  (Normal) Collected: 09/27/24 1851    Lab Status: Final result Specimen: Swab from Nasopharynx Updated: 09/27/24 1955     ADENOVIRUS, PCR Not Detected     Coronavirus 229E Not Detected     Coronavirus HKU1 Not Detected     Coronavirus NL63 Not Detected     Coronavirus OC43 Not Detected     COVID19 Not Detected     Human Metapneumovirus Not Detected     Human Rhinovirus/Enterovirus Not Detected     Influenza A PCR Not Detected     Influenza B PCR Not Detected     Parainfluenza Virus 1 Not Detected     Parainfluenza Virus 2 Not Detected     Parainfluenza Virus 3 Not Detected     Parainfluenza Virus 4 Not Detected     RSV, PCR Not Detected     Bordetella pertussis pcr Not Detected      Bordetella parapertussis PCR Not Detected     Chlamydophila pneumoniae PCR Not Detected     Mycoplasma pneumo by PCR Not Detected    Narrative:      In the setting of a positive respiratory panel with a viral infection PLUS a negative procalcitonin without other underlying concern for bacterial infection, consider observing off antibiotics or discontinuation of antibiotics and continue supportive care. If the respiratory panel is positive for atypical bacterial infection (Bordetella pertussis, Chlamydophila pneumoniae, or Mycoplasma pneumoniae), consider antibiotic de-escalation to target atypical bacterial infection.    MRSA Screen, PCR (Inpatient) - Swab, Nares [[id number]]  (Normal) Collected: 09/27/24 1851    Lab Status: Final result Specimen: Swab from Nares Updated: 09/27/24 2017     MRSA PCR No MRSA Detected    Narrative:      The negative predictive value of this diagnostic test is high and should only be used to consider de-escalating anti-MRSA therapy. A positive result may indicate colonization with MRSA and must be correlated clinically.    Blood Culture - Blood, Arm, Left [[id number]]  (Abnormal)  (Susceptibility) Collected: 09/27/24 1521    Lab Status: Edited Result - FINAL Specimen: Blood from Arm, Left Updated: 10/01/24 0627     Blood Culture Streptococcus mitis / oralis     Isolated from Aerobic Bottle     Blood Culture Streptococcus sanguinis     Isolated from Anaerobic Bottle     Gram Stain Aerobic Bottle Gram positive cocci in chains      Anaerobic Bottle Gram positive cocci in chains    Narrative:            Susceptibility        Streptococcus mitis / oralis      MULUGETA      Ceftriaxone Susceptible      Penicillin G Intermediate      Vancomycin Susceptible                       Susceptibility        Streptococcus sanguinis      MULUGETA      Ceftriaxone Susceptible      Penicillin G Intermediate      Vancomycin Susceptible                           Blood Culture - Blood, Arm, Left [[id number]]  (Abnormal)  Collected: 09/27/24 1521    Lab Status: Final result Specimen: Blood from Arm, Left Updated: 10/01/24 0627     Blood Culture Streptococcus mitis / oralis     Isolated from Aerobic Bottle     Gram Stain Aerobic Bottle Gram positive cocci in chains    Narrative:      Refer to previous blood culture collected on 09/27/2024 1521 for MICs.    Blood Culture ID, PCR - Blood, Arm, Left [[id number]]  (Abnormal) Collected: 09/27/24 1521    Lab Status: Final result Specimen: Blood from Arm, Left Updated: 09/28/24 0943     BCID, PCR Streptococcus spp, not A, B, or pneumoniae. Identification by BCID2 PCR.     BOTTLE TYPE Aerobic Bottle                      Results for orders placed during the hospital encounter of 09/27/24    Adult Transthoracic Echo Complete W/ Cont if Necessary Per Protocol    Interpretation Summary    Left ventricular ejection fraction appears to be 56 - 60%.    Left ventricular diastolic function was normal.      Labs Pending at Discharge:  Pending Labs       Order Current Status    Carboxyhemoglobin Collected (09/27/24 1355)    Heparin-induced Platelet Antibody In process    Serotonin Release Assay In process    Blood Culture - Blood, Arm, Left Preliminary result    Blood Culture - Blood, Arm, Left Preliminary result            Time spent on Discharge including face to face service:  31 minutes    Electronically signed by Fidel Darnell MD, 10/02/24, 8:48 AM EDT.

## 2024-10-02 NOTE — SIGNIFICANT NOTE
10/02/24 0928   Plan   Plan Pt will discharge home today. Pt will discharge on Eliquis. Free 30 day Voucher used at pharm. and $10.00 copay voucher given to Pt.   Plan Comments Patient is needing FMLA paperwork completed. SW took form to hospitalist office. Patient also reported that he is a . SW provided patient and wife [facility] phone number. Patient and wife still unsure about their discharge needs. SW will continue to follow patient.   Final Discharge Disposition Code 01 - home or self-care   Final Note Discharge home today.       
No

## 2024-10-02 NOTE — PLAN OF CARE
Goal Outcome Evaluation:         Patient discharging home with family

## 2024-10-02 NOTE — CONSULTS
Met with patient at bedside. Patient much more engaging and receptive to education today. Discussed A1c of 13.9% with an estimated average glucose of 352 mg/dl and how the goal A1c is less than 7%.    Educated on side effects of prolonged hyperglycemia. Educated on carbohydrate counting, American Diabetes Association guidelines for diabetes diet, adding more protein and vegetables to meals, and always checking blood sugar before meals. Discussed long vs rapid acting insulin and when to administer each. Patient was able to teach back show back insulin administration via insulin pen.     Patient states that he will follow up with a primary care provider in [place]. Our pharmacy does now have the Accu Chek Guide glucometer so patient should discharge with glucometer and insulin. Patient states he has a strong support system at home who will assist with his diabetes management. No further needs or questions at this time.       Recommend on discharge:  Rx for Lantus Solostar insulin pen  Rx for Humalog Kwikpen insulin pen  Rx for Accu-Chek Guide glucometer, test strips, lancets (pended)  Rx for insulin pen needles (pended)  Rx for alcohol swabs (pended)

## 2024-10-03 LAB
BACTERIA SPEC AEROBE CULT: NORMAL
BACTERIA SPEC AEROBE CULT: NORMAL
SRA .2 IU/ML UFH SER-ACNC: <1 % (ref 0–20)
SRA 100IU/ML UFH SER-ACNC: <1 % (ref 0–20)
SRA UFH SER-IMP: NORMAL

## 2024-10-03 NOTE — OUTREACH NOTE
Prep Survey      Flowsheet Row Responses   [facility] facility patient discharged from? [facility]   Is LACE score < 7 ? No   Eligibility Readm Mgmt   Discharge diagnosis DKA (diabetic ketoacidosis)   Does the patient have one of the following disease processes/diagnoses(primary or secondary)? Other   Prep survey completed? Yes            Leslee OREILLY - Registered Nurse

## 2024-10-07 ENCOUNTER — READMISSION MANAGEMENT (OUTPATIENT)
Dept: CALL CENTER | Facility: HOSPITAL | Age: 49
End: 2024-10-07

## 2024-10-07 NOTE — OUTREACH NOTE
Medical Week 1 Survey      Flowsheet Row Responses   [facility] facility patient discharged from? [facility]   Does the patient have one of the following disease processes/diagnoses(primary or secondary)? Other   Week 1 attempt successful? No   Unsuccessful attempts Attempt 1            Leslee OREILLY - Registered Nurse

## 2024-10-11 ENCOUNTER — READMISSION MANAGEMENT (OUTPATIENT)
Dept: CALL CENTER | Facility: HOSPITAL | Age: 49
End: 2024-10-11

## 2024-10-11 NOTE — OUTREACH NOTE
Medical Week 1 Survey      Flowsheet Row Responses   [facility] patient discharged from? [facility]   Does the patient have one of the following disease processes/diagnoses(primary or secondary)? Other   Week 1 attempt successful? Yes   Call start time 1219   Call end time 1227   Discharge diagnosis DKA (diabetic ketoacidosis)   Meds reviewed with patient/caregiver? Yes   Is the patient having any side effects they believe may be caused by any medication additions or changes? No   Does the patient have all medications ordered at discharge? Yes   Prescription comments Pt is no longer taking Eliquis,  wife reports called from hospital and told to discontinue and has followed up with PCP who felt Eliquis not needed   Is the patient taking all medications as directed (includes completed medication regime)? Yes   Medication comments Wife reports that PCP is titrating down on insulin, Lantus is down to 20unit BID--pt has not been using any meal time insulin.  PCP plans on titrating off and starting on Metformin   Does the patient have a primary care provider?  Yes   Does the patient have an appointment with their PCP within 7 days of discharge? Yes   Has the patient kept scheduled appointments due by today? Yes   Has home health visited the patient within 72 hours of discharge? N/A   Did the patient receive a copy of their discharge instructions? Yes   Nursing interventions Reviewed instructions with patient   What is the patient's perception of their health status since discharge? Same  [Wife reports pt still having some vision issues and neuropathy, reports BG , has seen PCP who is making med changes at this time.Aware to seek care prn.  Pt has beenn able to return to his home in [place].]   Is the patient/caregiver able to teach back signs and symptoms related to disease process for when to call PCP? Yes   Is the patient/caregiver able to teach back signs and symptoms related to disease process for when to call 911?  Yes   Week 1 call completed? Yes   Graduated Yes   Call end time 1223            OMAR YEE - Registered Nurse